# Patient Record
Sex: MALE | Race: ASIAN | NOT HISPANIC OR LATINO | URBAN - METROPOLITAN AREA
[De-identification: names, ages, dates, MRNs, and addresses within clinical notes are randomized per-mention and may not be internally consistent; named-entity substitution may affect disease eponyms.]

---

## 2021-05-22 ENCOUNTER — EMERGENCY (EMERGENCY)
Facility: HOSPITAL | Age: 26
LOS: 0 days | Discharge: HOME | End: 2021-05-22
Attending: EMERGENCY MEDICINE
Payer: COMMERCIAL

## 2021-05-22 VITALS
DIASTOLIC BLOOD PRESSURE: 67 MMHG | HEART RATE: 78 BPM | SYSTOLIC BLOOD PRESSURE: 120 MMHG | TEMPERATURE: 99 F | RESPIRATION RATE: 19 BRPM | OXYGEN SATURATION: 100 %

## 2021-05-22 VITALS
DIASTOLIC BLOOD PRESSURE: 77 MMHG | OXYGEN SATURATION: 99 % | WEIGHT: 149.91 LBS | SYSTOLIC BLOOD PRESSURE: 124 MMHG | TEMPERATURE: 99 F | RESPIRATION RATE: 18 BRPM | HEART RATE: 87 BPM

## 2021-05-22 DIAGNOSIS — E86.0 DEHYDRATION: ICD-10-CM

## 2021-05-22 DIAGNOSIS — A04.72 ENTEROCOLITIS DUE TO CLOSTRIDIUM DIFFICILE, NOT SPECIFIED AS RECURRENT: ICD-10-CM

## 2021-05-22 DIAGNOSIS — R19.7 DIARRHEA, UNSPECIFIED: ICD-10-CM

## 2021-05-22 LAB
ALBUMIN SERPL ELPH-MCNC: 3.8 G/DL — SIGNIFICANT CHANGE UP (ref 3.3–5)
ALP SERPL-CCNC: 65 U/L — SIGNIFICANT CHANGE UP (ref 40–120)
ALT FLD-CCNC: 13 U/L — SIGNIFICANT CHANGE UP (ref 12–78)
ANION GAP SERPL CALC-SCNC: 6 MMOL/L — SIGNIFICANT CHANGE UP (ref 5–17)
AST SERPL-CCNC: 9 U/L — LOW (ref 15–37)
BASOPHILS # BLD AUTO: 0 K/UL — SIGNIFICANT CHANGE UP (ref 0–0.2)
BASOPHILS NFR BLD AUTO: 0 % — SIGNIFICANT CHANGE UP (ref 0–2)
BILIRUB SERPL-MCNC: 0.4 MG/DL — SIGNIFICANT CHANGE UP (ref 0.2–1.2)
BUN SERPL-MCNC: 7 MG/DL — SIGNIFICANT CHANGE UP (ref 7–23)
CALCIUM SERPL-MCNC: 9.7 MG/DL — SIGNIFICANT CHANGE UP (ref 8.5–10.1)
CHLORIDE SERPL-SCNC: 107 MMOL/L — SIGNIFICANT CHANGE UP (ref 96–108)
CO2 SERPL-SCNC: 27 MMOL/L — SIGNIFICANT CHANGE UP (ref 22–31)
CREAT SERPL-MCNC: 1.33 MG/DL — HIGH (ref 0.5–1.3)
EOSINOPHIL # BLD AUTO: 0.19 K/UL — SIGNIFICANT CHANGE UP (ref 0–0.5)
EOSINOPHIL NFR BLD AUTO: 2 % — SIGNIFICANT CHANGE UP (ref 0–6)
GLUCOSE SERPL-MCNC: 88 MG/DL — SIGNIFICANT CHANGE UP (ref 70–99)
HCT VFR BLD CALC: 42.8 % — SIGNIFICANT CHANGE UP (ref 39–50)
HGB BLD-MCNC: 14.5 G/DL — SIGNIFICANT CHANGE UP (ref 13–17)
LIDOCAIN IGE QN: 40 U/L — LOW (ref 73–393)
LYMPHOCYTES # BLD AUTO: 1.68 K/UL — SIGNIFICANT CHANGE UP (ref 1–3.3)
LYMPHOCYTES # BLD AUTO: 18 % — SIGNIFICANT CHANGE UP (ref 13–44)
MANUAL SMEAR VERIFICATION: SIGNIFICANT CHANGE UP
MCHC RBC-ENTMCNC: 29.4 PG — SIGNIFICANT CHANGE UP (ref 27–34)
MCHC RBC-ENTMCNC: 33.9 GM/DL — SIGNIFICANT CHANGE UP (ref 32–36)
MCV RBC AUTO: 86.6 FL — SIGNIFICANT CHANGE UP (ref 80–100)
MONOCYTES # BLD AUTO: 1.3 K/UL — HIGH (ref 0–0.9)
MONOCYTES NFR BLD AUTO: 14 % — SIGNIFICANT CHANGE UP (ref 2–14)
NEUTROPHILS # BLD AUTO: 6.14 K/UL — SIGNIFICANT CHANGE UP (ref 1.8–7.4)
NEUTROPHILS NFR BLD AUTO: 63 % — SIGNIFICANT CHANGE UP (ref 43–77)
NEUTS BAND # BLD: 3 % — SIGNIFICANT CHANGE UP (ref 0–8)
NRBC # BLD: 0 /100 — SIGNIFICANT CHANGE UP (ref 0–0)
NRBC # BLD: SIGNIFICANT CHANGE UP /100 WBCS (ref 0–0)
PLAT MORPH BLD: NORMAL — SIGNIFICANT CHANGE UP
PLATELET # BLD AUTO: 410 K/UL — HIGH (ref 150–400)
POTASSIUM SERPL-MCNC: 3 MMOL/L — LOW (ref 3.5–5.3)
POTASSIUM SERPL-SCNC: 3 MMOL/L — LOW (ref 3.5–5.3)
PROT SERPL-MCNC: 7.8 GM/DL — SIGNIFICANT CHANGE UP (ref 6–8.3)
RBC # BLD: 4.94 M/UL — SIGNIFICANT CHANGE UP (ref 4.2–5.8)
RBC # FLD: 12.4 % — SIGNIFICANT CHANGE UP (ref 10.3–14.5)
RBC BLD AUTO: NORMAL — SIGNIFICANT CHANGE UP
SODIUM SERPL-SCNC: 140 MMOL/L — SIGNIFICANT CHANGE UP (ref 135–145)
WBC # BLD: 9.31 K/UL — SIGNIFICANT CHANGE UP (ref 3.8–10.5)
WBC # FLD AUTO: 9.31 K/UL — SIGNIFICANT CHANGE UP (ref 3.8–10.5)

## 2021-05-22 PROCEDURE — 96360 HYDRATION IV INFUSION INIT: CPT

## 2021-05-22 PROCEDURE — 80053 COMPREHEN METABOLIC PANEL: CPT

## 2021-05-22 PROCEDURE — 99285 EMERGENCY DEPT VISIT HI MDM: CPT

## 2021-05-22 PROCEDURE — 99283 EMERGENCY DEPT VISIT LOW MDM: CPT | Mod: 25

## 2021-05-22 PROCEDURE — 83690 ASSAY OF LIPASE: CPT

## 2021-05-22 PROCEDURE — 36415 COLL VENOUS BLD VENIPUNCTURE: CPT

## 2021-05-22 PROCEDURE — 85025 COMPLETE CBC W/AUTO DIFF WBC: CPT

## 2021-05-22 RX ORDER — POTASSIUM CHLORIDE 20 MEQ
40 PACKET (EA) ORAL ONCE
Refills: 0 | Status: DISCONTINUED | OUTPATIENT
Start: 2021-05-22 | End: 2021-05-22

## 2021-05-22 RX ORDER — SODIUM CHLORIDE 9 MG/ML
2000 INJECTION INTRAMUSCULAR; INTRAVENOUS; SUBCUTANEOUS ONCE
Refills: 0 | Status: COMPLETED | OUTPATIENT
Start: 2021-05-22 | End: 2021-05-22

## 2021-05-22 RX ORDER — POTASSIUM CHLORIDE 20 MEQ
40 PACKET (EA) ORAL ONCE
Refills: 0 | Status: COMPLETED | OUTPATIENT
Start: 2021-05-22 | End: 2021-05-22

## 2021-05-22 RX ORDER — MORPHINE SULFATE 50 MG/1
4 CAPSULE, EXTENDED RELEASE ORAL ONCE
Refills: 0 | Status: DISCONTINUED | OUTPATIENT
Start: 2021-05-22 | End: 2021-05-22

## 2021-05-22 RX ADMIN — SODIUM CHLORIDE 2000 MILLILITER(S): 9 INJECTION INTRAMUSCULAR; INTRAVENOUS; SUBCUTANEOUS at 13:28

## 2021-05-22 RX ADMIN — SODIUM CHLORIDE 2000 MILLILITER(S): 9 INJECTION INTRAMUSCULAR; INTRAVENOUS; SUBCUTANEOUS at 14:28

## 2021-05-22 RX ADMIN — Medication 40 MILLIEQUIVALENT(S): at 14:12

## 2021-05-22 NOTE — ED PROVIDER NOTE - CARE PROVIDER_API CALL
Brendan Doherty)  Gastroenterology; Internal Medicine  [address] [place] [address]  Phone: [phone]  Fax: [phone]  Follow Up Time: 1-3 Days

## 2021-05-22 NOTE — ED ADULT TRIAGE NOTE - CHIEF COMPLAINT QUOTE
Pt presents to the Ed with c/o bloody diarrhea since Sunday.  Sent in by Dr. Gipson for IV hydration.  Hx cdiff. Denies sob or chest pain.

## 2021-05-22 NOTE — ED PROVIDER NOTE - CARE PROVIDERS DIRECT ADDRESSES
[email or web address]

## 2021-05-22 NOTE — ED PROVIDER NOTE - OBJECTIVE STATEMENT
26 y/o male with pmhx of cdiff presents to the ED sent in by Dr. Gipson for IV hydration. Pt has been experiencing bloody stools x6 days. +abd pain. pt treated oral Vancomycin yesterday, takes every 6 hours. Pt states every  time he eats, it goes right through him. Denies SOB, chest pain, vomiting. Denies hx of sx on the abd. No other complaints at this time.

## 2021-05-22 NOTE — ED PROVIDER NOTE - CLINICAL SUMMARY MEDICAL DECISION MAKING FREE TEXT BOX
will discuss with dr ynag regarding care going forward, already had CT which showed pan coleitis, already on Vancomycin, ,really here mostly for IV hydration, will provide iv hydration, check basic labs and reassess. will discuss with dr yang regarding care going forward, already had CT which showed pan colitis, already on oral Vancomycin, ,really here mostly for IV hydration, will provide iv hydration, check basic labs and reassess.

## 2021-05-22 NOTE — ED PROVIDER NOTE - NSFOLLOWUPINSTRUCTIONS_ED_ALL_ED_FT
Acute Diarrhea    WHAT YOU NEED TO KNOW:    Acute diarrhea starts quickly and lasts a short time, usually 1 to 3 days. It can last up to 2 weeks. You may not be able to control your diarrhea. Acute diarrhea usually stops on its own.     DISCHARGE INSTRUCTIONS:    Return to the emergency department if:     You feel confused.       Your heartbeat is faster than usual.       Your eyes look deeply sunken, or you have no tears when you cry.       You urinate less than usual, or your urine is dark yellow.       You have blood or mucus in your bowel movements.      You have severe abdominal pain.       You are unable to drink any liquids.     Contact your healthcare provider if:     Your symptoms do not get better with treatment.       You have a fever higher than 101.3°F (38.5°C).       You have trouble eating and drinking because you are vomiting.       Your diarrhea does not get better in 7 days.       You have questions or concerns about your condition or care.     Follow up with your healthcare provider as directed: Write down your questions so you remember to ask them during your visits.     Medicines:    Diarrhea medicine is an over-the-counter medicine that helps slow or stop your diarrhea. Do not take this medicine unless your healthcare provider says it is okay.       Antibiotics may be given to help treat an infection caused by bacteria.       Antiparasitics may be given to treat an infection caused by parasites.       Take your medicine as directed. Contact your healthcare provider if you think your medicine is not helping or if you have side effects. Tell him of her if you are allergic to any medicine. Keep a list of the medicines, vitamins, and herbs you take. Include the amounts, and when and why you take them. Bring the list or the pill bottles to follow-up visits. Carry your medicine list with you in case of an emergency.    Self-care:     Drink liquids as directed. Liquids will help prevent dehydration caused by diarrhea. Ask your healthcare provider how much liquid to drink each day and which liquids are best for you. You may need to drink an oral rehydration solution (ORS). An ORS has the right amounts of water, salts, and sugar you need to replace body fluids. You can buy an ORS at most grocery stores and pharmacies.       Eat foods that are easy to digest. Examples include rice, lentils, cereal, bananas, potatoes, and bread. It also includes some fruits (bananas, melon), well-cooked vegetables, and lean meats. Do not eat foods high in fiber, fat, and sugar. Do not drink alcohol until your diarrhea is gone.     Prevent acute diarrhea:     Wash your hands often. Use soap and water. Wash your hands before you eat or prepare food. Also wash your hands after you use the bathroom. Use an alcohol-based hand gel when soap and water are not available. Handwashing           Keep bathroom surfaces clean. This helps prevent the spread of germs that cause acute diarrhea.       Wash fruits and vegetables well before you eat them. This can help remove germs that cause diarrhea. If possible, remove the skin from fruits and vegetables, or cook them well before you eat them.       Cook meat and poultry as directed. Meat includes beef and pork. Poultry includes chicken, turkey, and duck.  Cook ground meat to 160°F.       Cook ground poultry, whole poultry, or cuts of poultry to at least 165°F. Remove the poultry from heat. Let it stand for 3 minutes before you eat it.       Cook whole cuts of meat other than poultry to at least 145°F. Remove the meat from heat. Let it stand for 3 minutes before you eat it.       Wash dishes that have touched raw meat or poultry with hot water and soap. This includes cutting boards, utensils, dishes, and serving containers.       Place raw or cooked meat or poultry in the refrigerator as soon as possible. Bacteria can grow in meat or poultry that is left at room temperature too long.       Do not eat raw or undercooked oysters, clams, or mussels. These foods may be contaminated and cause infection.       Drink only filtered or treated water when you travel. Do not put ice in your drinks. Drink bottled water whenever possible.      Dehydration    WHAT YOU NEED TO KNOW:    Dehydration is a condition that develops when your body does not have enough fluid. You may become dehydrated if you do not drink enough water or lose too much fluid. Fluid loss may also cause loss of electrolytes (minerals), such as sodium.    DISCHARGE INSTRUCTIONS:    Seek care immediately if:   •You have a seizure.      •You are confused or cannot think clearly.      •You are extremely sleepy, or another person cannot wake you.       •You become dizzy or faint when you stand.      •You are not able to urinate.      •You have trouble breathing.      •You have a fast or irregular heartbeat.      •Your hands or feet are cold, or your face is pale.       Contact your healthcare provider if:   •You have trouble drinking liquids because you are vomiting.      •Your symptoms get worse.      •You have a fever.       •You feel very weak or tired.      •You have questions or concerns about your condition or care.      Follow up with your healthcare provider as directed: Write down your questions so you remember to ask them during your visits.     Prevent or manage dehydration:   •Drink liquids as directed. Liquids that contain water, sugar, and minerals can help your body hold in fluid and help prevent dehydration. Drink liquids throughout the day, not just when you feel thirsty. Men should drink about 3 liters (13 eight-ounce cups) of liquid each day. Women should drink about 2 liters (9 eight-ounce cups) of liquid each day. Drink even more liquid if you will be outdoors, in the sun for a long time, or exercising.       •Stay cool. Limit the time you spend outdoors during the hottest part of the day. Dress in lightweight clothes.       •Keep track of how often you urinate. If you urinate less than usual or your urine is darker, drink more liquids.

## 2021-05-22 NOTE — ED PROVIDER NOTE - CARE PLAN
Principal Discharge DX:	Dehydration  Secondary Diagnosis:	Bloody diarrhea  Secondary Diagnosis:	Clostridium difficile diarrhea

## 2021-05-22 NOTE — ED PROVIDER NOTE - PATIENT PORTAL LINK FT
You can access the FollowMyHealth Patient Portal offered by [facility] by registering at the following website: [email or web address]. By joining [employer]’s FollowMyHealth portal, you will also be able to view your health information using other applications (apps) compatible with our system.

## 2021-05-22 NOTE — ED PROVIDER NOTE - NO SIGNIFICANT PAST SURGICAL HISTORY
<<----- Click to add NO significant Past Surgical History PLT count 77k this admission, compared to 75k on previous admission (comparable).  This, in addition to elevated INR and hyponatremia, are likely in setting of end stage liver disease.   No active bleeding.

## 2021-05-22 NOTE — ED PROVIDER NOTE - PROGRESS NOTE DETAILS
Discussed with Dr. Doherty.  Pt to have h/h checked.  No symptoms of anemia.  h/h unremarkable.  AVSS.  Minimal ttp to abdomen.  Tolerating PO.  On appropriate meds.  Likely some combo of uc flare and C. diff.  Otherwise well appearing.  D/c home with strict return precautions and prompt outpatient f/u.

## 2021-05-22 NOTE — ED ADULT NURSE NOTE - CHPI ED NUR SYMPTOMS NEG
no abdominal distension/no burning urination/no chills/no dysuria/no fever/no hematuria/no nausea/no vomiting

## 2021-05-22 NOTE — ED ADULT NURSE NOTE - OBJECTIVE STATEMENT
Pt alert and oriented x4, able to ambulate without assistance. Pt history colitis and cdiff on oral vancomycin. Pt denies vomiting, chest pain, shortness of breath. Pt also states he has blood in stool. Pt denies fever/chills

## 2021-05-31 ENCOUNTER — INPATIENT (INPATIENT)
Facility: HOSPITAL | Age: 26
LOS: 2 days | Discharge: HOME | DRG: 372 | End: 2021-06-03
Attending: INTERNAL MEDICINE | Admitting: STUDENT IN AN ORGANIZED HEALTH CARE EDUCATION/TRAINING PROGRAM
Payer: COMMERCIAL

## 2021-05-31 VITALS — HEIGHT: 72 IN | WEIGHT: 156.97 LBS

## 2021-05-31 LAB
ALBUMIN SERPL ELPH-MCNC: 3 G/DL — LOW (ref 3.3–5)
ALP SERPL-CCNC: 61 U/L — SIGNIFICANT CHANGE UP (ref 40–120)
ALT FLD-CCNC: 23 U/L — SIGNIFICANT CHANGE UP (ref 12–78)
ANION GAP SERPL CALC-SCNC: 4 MMOL/L — LOW (ref 5–17)
AST SERPL-CCNC: 15 U/L — SIGNIFICANT CHANGE UP (ref 15–37)
BASOPHILS # BLD AUTO: 0.09 K/UL — SIGNIFICANT CHANGE UP (ref 0–0.2)
BASOPHILS NFR BLD AUTO: 0.8 % — SIGNIFICANT CHANGE UP (ref 0–2)
BILIRUB SERPL-MCNC: 0.2 MG/DL — SIGNIFICANT CHANGE UP (ref 0.2–1.2)
BUN SERPL-MCNC: 9 MG/DL — SIGNIFICANT CHANGE UP (ref 7–23)
CALCIUM SERPL-MCNC: 8.9 MG/DL — SIGNIFICANT CHANGE UP (ref 8.5–10.1)
CHLORIDE SERPL-SCNC: 106 MMOL/L — SIGNIFICANT CHANGE UP (ref 96–108)
CO2 SERPL-SCNC: 29 MMOL/L — SIGNIFICANT CHANGE UP (ref 22–31)
CREAT SERPL-MCNC: 1.14 MG/DL — SIGNIFICANT CHANGE UP (ref 0.5–1.3)
EOSINOPHIL # BLD AUTO: 0.67 K/UL — HIGH (ref 0–0.5)
EOSINOPHIL NFR BLD AUTO: 5.8 % — SIGNIFICANT CHANGE UP (ref 0–6)
GLUCOSE SERPL-MCNC: 87 MG/DL — SIGNIFICANT CHANGE UP (ref 70–99)
HCT VFR BLD CALC: 37.1 % — LOW (ref 39–50)
HGB BLD-MCNC: 12.6 G/DL — LOW (ref 13–17)
IMM GRANULOCYTES NFR BLD AUTO: 0.4 % — SIGNIFICANT CHANGE UP (ref 0–1.5)
LACTATE SERPL-SCNC: 1 MMOL/L — SIGNIFICANT CHANGE UP (ref 0.7–2)
LIDOCAIN IGE QN: 93 U/L — SIGNIFICANT CHANGE UP (ref 73–393)
LYMPHOCYTES # BLD AUTO: 18 % — SIGNIFICANT CHANGE UP (ref 13–44)
LYMPHOCYTES # BLD AUTO: 2.07 K/UL — SIGNIFICANT CHANGE UP (ref 1–3.3)
MAGNESIUM SERPL-MCNC: 2.3 MG/DL — SIGNIFICANT CHANGE UP (ref 1.6–2.6)
MCHC RBC-ENTMCNC: 30 PG — SIGNIFICANT CHANGE UP (ref 27–34)
MCHC RBC-ENTMCNC: 34 GM/DL — SIGNIFICANT CHANGE UP (ref 32–36)
MCV RBC AUTO: 88.3 FL — SIGNIFICANT CHANGE UP (ref 80–100)
MONOCYTES # BLD AUTO: 1.78 K/UL — HIGH (ref 0–0.9)
MONOCYTES NFR BLD AUTO: 15.5 % — HIGH (ref 2–14)
NEUTROPHILS # BLD AUTO: 6.85 K/UL — SIGNIFICANT CHANGE UP (ref 1.8–7.4)
NEUTROPHILS NFR BLD AUTO: 59.5 % — SIGNIFICANT CHANGE UP (ref 43–77)
PLATELET # BLD AUTO: 549 K/UL — HIGH (ref 150–400)
POTASSIUM SERPL-MCNC: 3.2 MMOL/L — LOW (ref 3.5–5.3)
POTASSIUM SERPL-SCNC: 3.2 MMOL/L — LOW (ref 3.5–5.3)
PROT SERPL-MCNC: 7.2 GM/DL — SIGNIFICANT CHANGE UP (ref 6–8.3)
RBC # BLD: 4.2 M/UL — SIGNIFICANT CHANGE UP (ref 4.2–5.8)
RBC # FLD: 12.8 % — SIGNIFICANT CHANGE UP (ref 10.3–14.5)
SODIUM SERPL-SCNC: 139 MMOL/L — SIGNIFICANT CHANGE UP (ref 135–145)
WBC # BLD: 11.51 K/UL — HIGH (ref 3.8–10.5)
WBC # FLD AUTO: 11.51 K/UL — HIGH (ref 3.8–10.5)

## 2021-05-31 PROCEDURE — 99285 EMERGENCY DEPT VISIT HI MDM: CPT

## 2021-05-31 RX ORDER — SODIUM CHLORIDE 9 MG/ML
1000 INJECTION INTRAMUSCULAR; INTRAVENOUS; SUBCUTANEOUS ONCE
Refills: 0 | Status: COMPLETED | OUTPATIENT
Start: 2021-05-31 | End: 2021-05-31

## 2021-05-31 RX ORDER — SODIUM CHLORIDE 9 MG/ML
2000 INJECTION INTRAMUSCULAR; INTRAVENOUS; SUBCUTANEOUS ONCE
Refills: 0 | Status: COMPLETED | OUTPATIENT
Start: 2021-05-31 | End: 2021-05-31

## 2021-05-31 RX ADMIN — SODIUM CHLORIDE 2000 MILLILITER(S): 9 INJECTION INTRAMUSCULAR; INTRAVENOUS; SUBCUTANEOUS at 22:13

## 2021-05-31 RX ADMIN — SODIUM CHLORIDE 2000 MILLILITER(S): 9 INJECTION INTRAMUSCULAR; INTRAVENOUS; SUBCUTANEOUS at 23:23

## 2021-05-31 NOTE — ED ADULT TRIAGE NOTE - CHIEF COMPLAINT QUOTE
diffuse abdominal pain, nausea, multiple episodes of diarrhea worsening over past two weeks.  diagnosed with c-diff colitis. completed full course vancomycin. states he feels dehydrated due to diarrhea.

## 2021-05-31 NOTE — ED ADULT NURSE NOTE - OBJECTIVE STATEMENT
pt presents to the ED c/o diffuse abdominal pain with diarrhea. Pt states he has a hx of UC and was recently diagnosed with c.diff. Pt states he completed a 10 day course of vancomycin. States he has about 10 episodes of diarrhea a day and "feels dehydrated." Pt denies chest pain, SOB, dizziness, nausea at this time. All safety measures in place.

## 2021-05-31 NOTE — ED PROVIDER NOTE - OBJECTIVE STATEMENT
26 y/o M with PMHx of UC and C. diff colitis presents ambulatory to the ED c/o diffuse +abd pain with associated +nausea and frequent episodes of +diarrhea. Notes +decreased PO intake, +15 lb weight loss, and +blood in stool as well. Recently dx with C. diff and already completed full 10 day course of vancomycin. Started on Dificid, has taken 2 doses so far without relief. No fever. NKDA. GI: Dr. Brendan Doherty.

## 2021-05-31 NOTE — ED PROVIDER NOTE - CLINICAL SUMMARY MEDICAL DECISION MAKING FREE TEXT BOX
CT oral contrast, rehydration. Pt likely with C. diff refractory to abx at this point. Also appears to have exacerbation of underlying UC with rectal bleeding, for which he's followed closely with Dr. Doherty. Dispo pending labs, imaging, reassess.

## 2021-06-01 DIAGNOSIS — A04.72 ENTEROCOLITIS DUE TO CLOSTRIDIUM DIFFICILE, NOT SPECIFIED AS RECURRENT: ICD-10-CM

## 2021-06-01 LAB
ALBUMIN SERPL ELPH-MCNC: 2.8 G/DL — LOW (ref 3.3–5)
ALP SERPL-CCNC: 50 U/L — SIGNIFICANT CHANGE UP (ref 40–120)
ALT FLD-CCNC: 14 U/L — SIGNIFICANT CHANGE UP (ref 12–78)
ANION GAP SERPL CALC-SCNC: 6 MMOL/L — SIGNIFICANT CHANGE UP (ref 5–17)
APPEARANCE UR: CLEAR — SIGNIFICANT CHANGE UP
APTT BLD: 27 SEC — LOW (ref 27.5–35.5)
AST SERPL-CCNC: 10 U/L — LOW (ref 15–37)
BASOPHILS # BLD AUTO: 0.07 K/UL — SIGNIFICANT CHANGE UP (ref 0–0.2)
BASOPHILS NFR BLD AUTO: 0.6 % — SIGNIFICANT CHANGE UP (ref 0–2)
BILIRUB SERPL-MCNC: 0.2 MG/DL — SIGNIFICANT CHANGE UP (ref 0.2–1.2)
BILIRUB UR-MCNC: NEGATIVE — SIGNIFICANT CHANGE UP
BUN SERPL-MCNC: 5 MG/DL — LOW (ref 7–23)
C DIFF BY PCR RESULT: SIGNIFICANT CHANGE UP
C DIFF TOX GENS STL QL NAA+PROBE: SIGNIFICANT CHANGE UP
CALCIUM SERPL-MCNC: 8.9 MG/DL — SIGNIFICANT CHANGE UP (ref 8.5–10.1)
CHLORIDE SERPL-SCNC: 111 MMOL/L — HIGH (ref 96–108)
CO2 SERPL-SCNC: 26 MMOL/L — SIGNIFICANT CHANGE UP (ref 22–31)
COLOR SPEC: YELLOW — SIGNIFICANT CHANGE UP
CREAT SERPL-MCNC: 0.91 MG/DL — SIGNIFICANT CHANGE UP (ref 0.5–1.3)
CULTURE RESULTS: SIGNIFICANT CHANGE UP
DIFF PNL FLD: NEGATIVE — SIGNIFICANT CHANGE UP
EOSINOPHIL # BLD AUTO: 0.63 K/UL — HIGH (ref 0–0.5)
EOSINOPHIL NFR BLD AUTO: 5.1 % — SIGNIFICANT CHANGE UP (ref 0–6)
GLUCOSE SERPL-MCNC: 87 MG/DL — SIGNIFICANT CHANGE UP (ref 70–99)
GLUCOSE UR QL: NEGATIVE MG/DL — SIGNIFICANT CHANGE UP
HCT VFR BLD CALC: 32.9 % — LOW (ref 39–50)
HGB BLD-MCNC: 11.1 G/DL — LOW (ref 13–17)
IMM GRANULOCYTES NFR BLD AUTO: 0.3 % — SIGNIFICANT CHANGE UP (ref 0–1.5)
INR BLD: 1.25 RATIO — HIGH (ref 0.88–1.16)
KETONES UR-MCNC: NEGATIVE — SIGNIFICANT CHANGE UP
LEUKOCYTE ESTERASE UR-ACNC: NEGATIVE — SIGNIFICANT CHANGE UP
LYMPHOCYTES # BLD AUTO: 19.1 % — SIGNIFICANT CHANGE UP (ref 13–44)
LYMPHOCYTES # BLD AUTO: 2.37 K/UL — SIGNIFICANT CHANGE UP (ref 1–3.3)
MAGNESIUM SERPL-MCNC: 2.2 MG/DL — SIGNIFICANT CHANGE UP (ref 1.6–2.6)
MCHC RBC-ENTMCNC: 29.9 PG — SIGNIFICANT CHANGE UP (ref 27–34)
MCHC RBC-ENTMCNC: 33.7 GM/DL — SIGNIFICANT CHANGE UP (ref 32–36)
MCV RBC AUTO: 88.7 FL — SIGNIFICANT CHANGE UP (ref 80–100)
MONOCYTES # BLD AUTO: 1.85 K/UL — HIGH (ref 0–0.9)
MONOCYTES NFR BLD AUTO: 14.9 % — HIGH (ref 2–14)
NEUTROPHILS # BLD AUTO: 7.46 K/UL — HIGH (ref 1.8–7.4)
NEUTROPHILS NFR BLD AUTO: 60 % — SIGNIFICANT CHANGE UP (ref 43–77)
NITRITE UR-MCNC: NEGATIVE — SIGNIFICANT CHANGE UP
PH UR: 7 — SIGNIFICANT CHANGE UP (ref 5–8)
PHOSPHATE SERPL-MCNC: 3 MG/DL — SIGNIFICANT CHANGE UP (ref 2.5–4.5)
PLATELET # BLD AUTO: 485 K/UL — HIGH (ref 150–400)
POTASSIUM SERPL-MCNC: 3.1 MMOL/L — LOW (ref 3.5–5.3)
POTASSIUM SERPL-SCNC: 3.1 MMOL/L — LOW (ref 3.5–5.3)
PROT SERPL-MCNC: 6.2 GM/DL — SIGNIFICANT CHANGE UP (ref 6–8.3)
PROT UR-MCNC: NEGATIVE MG/DL — SIGNIFICANT CHANGE UP
PROTHROM AB SERPL-ACNC: 14.3 SEC — HIGH (ref 10.6–13.6)
RAPID RVP RESULT: SIGNIFICANT CHANGE UP
RBC # BLD: 3.71 M/UL — LOW (ref 4.2–5.8)
RBC # FLD: 12.8 % — SIGNIFICANT CHANGE UP (ref 10.3–14.5)
SARS-COV-2 RNA SPEC QL NAA+PROBE: SIGNIFICANT CHANGE UP
SODIUM SERPL-SCNC: 143 MMOL/L — SIGNIFICANT CHANGE UP (ref 135–145)
SP GR SPEC: 1 — LOW (ref 1.01–1.02)
SPECIMEN SOURCE: SIGNIFICANT CHANGE UP
UROBILINOGEN FLD QL: NEGATIVE MG/DL — SIGNIFICANT CHANGE UP
WBC # BLD: 12.42 K/UL — HIGH (ref 3.8–10.5)
WBC # FLD AUTO: 12.42 K/UL — HIGH (ref 3.8–10.5)

## 2021-06-01 PROCEDURE — 85025 COMPLETE CBC W/AUTO DIFF WBC: CPT

## 2021-06-01 PROCEDURE — 74177 CT ABD & PELVIS W/CONTRAST: CPT | Mod: 26,MA

## 2021-06-01 PROCEDURE — 36415 COLL VENOUS BLD VENIPUNCTURE: CPT

## 2021-06-01 PROCEDURE — 99223 1ST HOSP IP/OBS HIGH 75: CPT

## 2021-06-01 PROCEDURE — 87252 VIRUS INOCULATION TISSUE: CPT

## 2021-06-01 PROCEDURE — 84100 ASSAY OF PHOSPHORUS: CPT

## 2021-06-01 PROCEDURE — 80048 BASIC METABOLIC PNL TOTAL CA: CPT

## 2021-06-01 PROCEDURE — 83735 ASSAY OF MAGNESIUM: CPT

## 2021-06-01 PROCEDURE — 88305 TISSUE EXAM BY PATHOLOGIST: CPT

## 2021-06-01 PROCEDURE — 85610 PROTHROMBIN TIME: CPT

## 2021-06-01 PROCEDURE — 85027 COMPLETE CBC AUTOMATED: CPT

## 2021-06-01 PROCEDURE — 80053 COMPREHEN METABOLIC PANEL: CPT

## 2021-06-01 PROCEDURE — 85730 THROMBOPLASTIN TIME PARTIAL: CPT

## 2021-06-01 PROCEDURE — 86769 SARS-COV-2 COVID-19 ANTIBODY: CPT

## 2021-06-01 RX ORDER — POTASSIUM CHLORIDE 20 MEQ
10 PACKET (EA) ORAL ONCE
Refills: 0 | Status: COMPLETED | OUTPATIENT
Start: 2021-06-01 | End: 2021-06-01

## 2021-06-01 RX ORDER — SODIUM CHLORIDE 9 MG/ML
1000 INJECTION INTRAMUSCULAR; INTRAVENOUS; SUBCUTANEOUS
Refills: 0 | Status: DISCONTINUED | OUTPATIENT
Start: 2021-06-01 | End: 2021-06-01

## 2021-06-01 RX ORDER — SOD SULF/SODIUM/NAHCO3/KCL/PEG
2000 SOLUTION, RECONSTITUTED, ORAL ORAL ONCE
Refills: 0 | Status: COMPLETED | OUTPATIENT
Start: 2021-06-01 | End: 2021-06-01

## 2021-06-01 RX ORDER — POTASSIUM CHLORIDE 20 MEQ
10 PACKET (EA) ORAL ONCE
Refills: 0 | Status: DISCONTINUED | OUTPATIENT
Start: 2021-06-01 | End: 2021-06-01

## 2021-06-01 RX ORDER — CIPROFLOXACIN LACTATE 400MG/40ML
500 VIAL (ML) INTRAVENOUS DAILY
Refills: 0 | Status: DISCONTINUED | OUTPATIENT
Start: 2021-06-01 | End: 2021-06-03

## 2021-06-01 RX ORDER — SODIUM CHLORIDE 9 MG/ML
1000 INJECTION INTRAMUSCULAR; INTRAVENOUS; SUBCUTANEOUS
Refills: 0 | Status: DISCONTINUED | OUTPATIENT
Start: 2021-06-01 | End: 2021-06-03

## 2021-06-01 RX ORDER — HEPARIN SODIUM 5000 [USP'U]/ML
5000 INJECTION INTRAVENOUS; SUBCUTANEOUS EVERY 12 HOURS
Refills: 0 | Status: DISCONTINUED | OUTPATIENT
Start: 2021-06-01 | End: 2021-06-03

## 2021-06-01 RX ORDER — MESALAMINE 400 MG
800 TABLET, DELAYED RELEASE (ENTERIC COATED) ORAL THREE TIMES A DAY
Refills: 0 | Status: DISCONTINUED | OUTPATIENT
Start: 2021-06-01 | End: 2021-06-03

## 2021-06-01 RX ORDER — FIDAXOMICIN 200 MG/5ML
1 GRANULE, FOR SUSPENSION ORAL
Qty: 0 | Refills: 0 | DISCHARGE

## 2021-06-01 RX ORDER — FIDAXOMICIN 200 MG/5ML
200 GRANULE, FOR SUSPENSION ORAL
Refills: 0 | Status: DISCONTINUED | OUTPATIENT
Start: 2021-06-01 | End: 2021-06-03

## 2021-06-01 RX ORDER — POTASSIUM CHLORIDE 20 MEQ
20 PACKET (EA) ORAL ONCE
Refills: 0 | Status: COMPLETED | OUTPATIENT
Start: 2021-06-01 | End: 2021-06-01

## 2021-06-01 RX ADMIN — SODIUM CHLORIDE 150 MILLILITER(S): 9 INJECTION INTRAMUSCULAR; INTRAVENOUS; SUBCUTANEOUS at 06:22

## 2021-06-01 RX ADMIN — Medication 2000 MILLILITER(S): at 17:29

## 2021-06-01 RX ADMIN — FIDAXOMICIN 200 MILLIGRAM(S): 200 GRANULE, FOR SUSPENSION ORAL at 22:08

## 2021-06-01 RX ADMIN — Medication 20 MILLIEQUIVALENT(S): at 06:22

## 2021-06-01 RX ADMIN — Medication 100 MILLIEQUIVALENT(S): at 11:22

## 2021-06-01 RX ADMIN — Medication 10 MILLIGRAM(S): at 18:20

## 2021-06-01 RX ADMIN — Medication 800 MILLIGRAM(S): at 22:08

## 2021-06-01 RX ADMIN — Medication 800 MILLIGRAM(S): at 15:58

## 2021-06-01 RX ADMIN — Medication 500 MILLIGRAM(S): at 15:58

## 2021-06-01 RX ADMIN — SODIUM CHLORIDE 150 MILLILITER(S): 9 INJECTION INTRAMUSCULAR; INTRAVENOUS; SUBCUTANEOUS at 17:29

## 2021-06-01 NOTE — ED ADULT NURSE REASSESSMENT NOTE - NS ED NURSE REASSESS COMMENT FT1
report given to Caroline SULLIVAN. Pt is stable at this time and awaiting med surg bed upstairs. Aware of plan at this time.

## 2021-06-01 NOTE — ED ADULT NURSE REASSESSMENT NOTE - NS ED NURSE REASSESS COMMENT FT1
pt resting comfortably at this time waiting for cat scan results. No episodes of diarrhea so far while in ED.

## 2021-06-01 NOTE — PROGRESS NOTE ADULT - SUBJECTIVE AND OBJECTIVE BOX
Reason for Admission: diarrhea  History of Present Illness:   26 y/o M w/ PMH of ulcerative colitis, p/w diarrhea. States he has had diarrhea for 2 weeks, and completed PO vanco for c.diff and was recently started on Dificid by his brother, who is a gastroenterologist (not affiliated [facility]). Patient also noticed seeing blood in stool, and has some abdominal pain. Presently patient is comfortable. Denies nausea, vomiting, fever, chills, cough, runny nose, sore throat, CP, SOB.    Medical progress: Patient very irritated -  and wants to leave. I provided to the patient with all the information in regards of the CT scan findings -  need for antibiotics.   Complaints: wants to leave as he feels well  Consult: Call made out to Dr. DURÁN -  awaiting callback.     REVIEW OF SYSTEMS:  General: NAD, hemodynamically stable, (-)  fever, (-) chills, (-) weakness  HEENT:  Eyes:  No visual loss, blurred vision, double vision or yellow sclerae. Ears, Nose, Throat:  No hearing loss, sneezing, congestion, runny nose or sore throat.  SKIN:  No rash or itching.  CARDIOVASCULAR:  No chest pain, chest pressure or chest discomfort. No palpitations or edema.  RESPIRATORY:  No shortness of breath, cough or sputum.  GASTROINTESTINAL:  No anorexia, nausea, vomiting or diarrhea. No abdominal pain or blood.  NEUROLOGICAL:  No headache, dizziness, syncope, paralysis, ataxia, numbness or tingling in the extremities. No change in bowel or bladder control.  MUSCULOSKELETAL:  No muscle, back pain, joint pain or stiffness.  HEMATOLOGIC:  No anemia, bleeding or bruising.  LYMPHATICS:  No enlarged nodes. No history of splenectomy.  ENDOCRINOLOGIC:  No reports of sweating, cold or heat intolerance. No polyuria or polydipsia.  ALLERGIES:  No history of asthma, hives, eczema or rhinitis.    Physical Exam:   GENERAL APPEARANCE:  NAD, hemodynamically stable  T(C): 37.1 (21 @ 07:10), Max: 37.1 (21 @ 07:10)  HR: 65 (21 @ 07:10) (65 - 89)  BP: 100/50 (21 @ 07:10) (100/50 - 118/75)  RR: 14 (21 @ 07:10) (14 - 18)  SpO2: 100% (21 @ 07:10) (97% - 100%)  Wt(kg): --  HEENT:  Head is normocephalic    Skin:  Warm and dry without any rash   NECK:  Supple without lymphadenopathy.   HEART:  Regular rate and rhythm. normal S1 and S2, No M/R/G  LUNGS:  Good ins/exp effort, no W/R/R/C  ABDOMEN:  Soft, nontender, nondistended with good bowel sounds heard  EXTREMITIES:  Without cyanosis, clubbing or edema.   NEUROLOGICAL:  Gross nonfocal       CBC Full  -  ( 2021 07:46 )  WBC Count : 12.42 K/uL  RBC Count : 3.71 M/uL  Hemoglobin : 11.1 g/dL  Hematocrit : 32.9 %  Platelet Count - Automated : 485 K/uL  Mean Cell Volume : 88.7 fl  Mean Cell Hemoglobin : 29.9 pg  Mean Cell Hemoglobin Concentration : 33.7 gm/dL  Auto Neutrophil # : 7.46 K/uL  Auto Lymphocyte # : 2.37 K/uL  Auto Monocyte # : 1.85 K/uL  Auto Eosinophil # : 0.63 K/uL  Auto Basophil # : 0.07 K/uL  Auto Neutrophil % : 60.0 %  Auto Lymphocyte % : 19.1 %  Auto Monocyte % : 14.9 %  Auto Eosinophil % : 5.1 %  Auto Basophil % : 0.6 %    PT/INR - ( 2021 07:46 )   PT: 14.3 sec;   INR: 1.25 ratio         PTT - ( 2021 07:46 )  PTT:27.0 sec  Urinalysis Basic - ( 2021 00:05 )    Color: Yellow / Appearance: Clear / S.005 / pH: x  Gluc: x / Ketone: Negative  / Bili: Negative / Urobili: Negative mg/dL   Blood: x / Protein: Negative mg/dL / Nitrite: Negative   Leuk Esterase: Negative / RBC: x / WBC x   Sq Epi: x / Non Sq Epi: x / Bacteria: x          143  |  111<H>  |  5<L>  ----------------------------<  87  3.1<L>   |  26  |  0.91    Ca    8.9      2021 07:46  Phos  3.0     06-  Mg     2.2     06-    TPro  6.2  /  Alb  2.8<L>  /  TBili  0.2  /  DBili  x   /  AST  10<L>  /  ALT  14  /  AlkPhos  50  -      26 y/o M w/ PMH of ulcerative colitis, p/w diarrhea.    * Colitis differential includes and is not limited to infectious /  informatory    - CT w/ recent C.diff s/p PO vanco and h/o ulcerative colitis  - F/u C.diff/ GI PCR  - Given h/o c.diff, will defer to GI and ID regarding starting Cipro / Flagyl at this time for other possible causes of colitis.  - Will c/w Dificid for now (non-formulary as per pharmacy, patient will have to take own med)  - IVF  - NPO for now  - Trend H/H   - Isolation precautions   - awaiting callback from GI    * Liver hypodense focus noted on CT  -F/u GI recommendations for further work up     * Hypokalemia  -Replete and recheck           Reason for Admission: diarrhea  History of Present Illness:   26 y/o M w/ PMH of ulcerative colitis, p/w diarrhea. States he has had diarrhea for 2 weeks, and completed PO vanco for c.diff and was recently started on Dificid by his brother, who is a gastroenterologist (not affiliated [facility]). Patient also noticed seeing blood in stool, and has some abdominal pain. Presently patient is comfortable. Denies nausea, vomiting, fever, chills, cough, runny nose, sore throat, CP, SOB.    Medical progress:  I provided to the patient with all the information in regards of the CT scan findings -  need for antibiotics. Colonoscopy in the am. NPO after midnight  Complaints: wants to leave as he feels well  Consult: Call made out to Dr. DURÁN -  awaiting callback.     REVIEW OF SYSTEMS:  General: NAD, hemodynamically stable, (-)  fever, (-) chills, (-) weakness  HEENT:  Eyes:  No visual loss, blurred vision, double vision or yellow sclerae. Ears, Nose, Throat:  No hearing loss, sneezing, congestion, runny nose or sore throat.  SKIN:  No rash or itching.  CARDIOVASCULAR:  No chest pain, chest pressure or chest discomfort. No palpitations or edema.  RESPIRATORY:  No shortness of breath, cough or sputum.  GASTROINTESTINAL:  No anorexia, nausea, vomiting or diarrhea. No abdominal pain or blood.  NEUROLOGICAL:  No headache, dizziness, syncope, paralysis, ataxia, numbness or tingling in the extremities. No change in bowel or bladder control.  MUSCULOSKELETAL:  No muscle, back pain, joint pain or stiffness.  HEMATOLOGIC:  No anemia, bleeding or bruising.  LYMPHATICS:  No enlarged nodes. No history of splenectomy.  ENDOCRINOLOGIC:  No reports of sweating, cold or heat intolerance. No polyuria or polydipsia.  ALLERGIES:  No history of asthma, hives, eczema or rhinitis.    Physical Exam:   GENERAL APPEARANCE:  NAD, hemodynamically stable  T(C): 37.1 (21 @ 07:10), Max: 37.1 (21 @ 07:10)  HR: 65 (21 @ 07:10) (65 - 89)  BP: 100/50 (21 @ 07:10) (100/50 - 118/75)  RR: 14 (06-01-21 @ 07:10) (14 - 18)  SpO2: 100% (21 @ 07:10) (97% - 100%)  Wt(kg): --  HEENT:  Head is normocephalic    Skin:  Warm and dry without any rash   NECK:  Supple without lymphadenopathy.   HEART:  Regular rate and rhythm. normal S1 and S2, No M/R/G  LUNGS:  Good ins/exp effort, no W/R/R/C  ABDOMEN:  Soft, nontender, nondistended with good bowel sounds heard  EXTREMITIES:  Without cyanosis, clubbing or edema.   NEUROLOGICAL:  Gross nonfocal       CBC Full  -  ( 2021 07:46 )  WBC Count : 12.42 K/uL  RBC Count : 3.71 M/uL  Hemoglobin : 11.1 g/dL  Hematocrit : 32.9 %  Platelet Count - Automated : 485 K/uL  Mean Cell Volume : 88.7 fl  Mean Cell Hemoglobin : 29.9 pg  Mean Cell Hemoglobin Concentration : 33.7 gm/dL  Auto Neutrophil # : 7.46 K/uL  Auto Lymphocyte # : 2.37 K/uL  Auto Monocyte # : 1.85 K/uL  Auto Eosinophil # : 0.63 K/uL  Auto Basophil # : 0.07 K/uL  Auto Neutrophil % : 60.0 %  Auto Lymphocyte % : 19.1 %  Auto Monocyte % : 14.9 %  Auto Eosinophil % : 5.1 %  Auto Basophil % : 0.6 %    PT/INR - ( 2021 07:46 )   PT: 14.3 sec;   INR: 1.25 ratio         PTT - ( 2021 07:46 )  PTT:27.0 sec  Urinalysis Basic - ( 2021 00:05 )    Color: Yellow / Appearance: Clear / S.005 / pH: x  Gluc: x / Ketone: Negative  / Bili: Negative / Urobili: Negative mg/dL   Blood: x / Protein: Negative mg/dL / Nitrite: Negative   Leuk Esterase: Negative / RBC: x / WBC x   Sq Epi: x / Non Sq Epi: x / Bacteria: x          143  |  111<H>  |  5<L>  ----------------------------<  87  3.1<L>   |  26  |  0.91    Ca    8.9      2021 07:46  Phos  3.0     06-  Mg     2.2     -    TPro  6.2  /  Alb  2.8<L>  /  TBili  0.2  /  DBili  x   /  AST  10<L>  /  ALT  14  /  AlkPhos  50  -      26 y/o M w/ PMH of ulcerative colitis, p/w diarrhea.    * Colitis differential includes and is not limited to infectious /  informatory    - CT w/ recent C.diff s/p PO vanco and h/o ulcerative colitis  - F/u C.diff/ GI PCR  - Given h/o c.diff, will defer to GI and ID regarding starting Cipro / Flagyl at this time for other possible causes of colitis.  - Will c/w Dificid for now (non-formulary as per pharmacy, patient will have to take own med)  - IVF  - NPO for now  - Trend H/H   - Isolation precautions   - awaiting callback from GI    * Liver hypodense focus noted on CT  -F/u GI recommendations for further work up     * Hypokalemia  -Replete and recheck

## 2021-06-01 NOTE — PATIENT PROFILE ADULT - NSPRONUTRITIONRISK_GEN_A_NUR
Significant decrease of oral intake greater than 3 days prior to admission/Unintentional weight loss prior to admission

## 2021-06-01 NOTE — CONSULT NOTE ADULT - ASSESSMENT
26 y/o M w/ PMH of ulcerative colitis, for which he uses mesalamine admitted on 6/1 for evaluation of severe diarrhea and abdominal pain. He states he started to have diarrhea in Mid May, was diagnosed with CDiff and was on oral vancomycin for 10 days; he did not take any antibiotics preceding his Cdiff diagnosis. Once he finished the po vancomycin, his diarrhea returned and he was started on Dificid prescribed by his brother who is a gastroenterologist and which he was on upon admission. He notes that he ate [place] food that was leftover and smelled odd about 24-48 hours prior to his severe diarrhea. GI PCR testing done on this admission is positive for Enterotoxigenic E coli and imaging is positive for long segment colitis from transverse colon to rectum. He is still having numerous watery stools.     1. Patient admitted with colitis, history of CDiff colitis and found to have Enterotoxigenic E coli in the stool; also noted with leukocytosis most likely reactive to infection  - follow up cultures   - CDiff assay is pending  - serial cbc and monitor temperature   - iv hydration and supportive care   - reviewed prior medical records to evaluate for resistant or atypical pathogens   - contact isolation  - patient to continue Dificid for CDiff, even if the toxin assay is negative because will be starting on ciprofloxacin 500 mg po q day for three days total, exposure to quinolone promotes CDiff  - will be treating the Enterotoxigenic E coli, given that the patient is immunocompromised given his Ulcerative colitis, therefore the start of ciprofloxain  - discussed with patient and hospitalist
Patient with history of ulcerative proctitis and may have progression of disease.  He had stool for C. difficile positive which can be associated with 25% exacerbation of patients with ulcerative colitis.  CT scan reveals more progression of disease.  His stool for PCR is positive for enterotoxigenic E. coli.  At times, this may be a false positive.  It is difficult to delineate the etiology of his disease, with evidence of progression of his ulcerative colitis versus enterotoxigenic E. coli.    Would start mesalamine  Reasonable to treat for enterotoxigenic E. coli inpatient however, increased risk of C. difficile complications reviewed with him and he should be maintained on treatment.    Plan for colonoscopy to assess for possible chronic changes throughout the colon that will point more towards chronic ulcerative colitis that may be going on.    Discussed with patient.  Message left for brother and I will try him again      Steroids and ABR DW pt

## 2021-06-01 NOTE — H&P ADULT - ASSESSMENT
24 y/o M w/ PMH of ulcerative colitis, p/w diarrhea.    *Colitis on CT w/ recent C.diff s/p PO vanco and h/o ulcerative colitis  -F/u C.diff/ GI PCR  -Given h/o c.diff, will defer to GI and ID regarding starting Cipro / Flagyl at this time for other possible causes of colitis.  -Will c/w Dificid for now  -GI consult  -ID consult   -IVF  -NPO for now  -IV PPI   -Trend H/H   -Isolation precautions     *Liver hypodense focus noted on CT  -F/u GI recommendations for further work up     *Hypokalemia  -Replete and recheck     *DVT ppx  -SCDs      26 y/o M w/ PMH of ulcerative colitis, p/w diarrhea.    *Colitis on CT w/ recent C.diff s/p PO vanco and h/o ulcerative colitis  -F/u C.diff/ GI PCR  -Given h/o c.diff, will defer to GI and ID regarding starting Cipro / Flagyl at this time for other possible causes of colitis.  -Will c/w Dificid for now  -GI consult  -ID consult   -IVF  -NPO for now  -Trend H/H   -Isolation precautions     *Liver hypodense focus noted on CT  -F/u GI recommendations for further work up     *Hypokalemia  -Replete and recheck     *DVT ppx  -SCDs      24 y/o M w/ PMH of ulcerative colitis, p/w diarrhea.    *Colitis on CT w/ recent C.diff s/p PO vanco and h/o ulcerative colitis  -F/u C.diff/ GI PCR  -Given h/o c.diff, will defer to GI and ID regarding starting Cipro / Flagyl at this time for other possible causes of colitis.  -Will c/w Dificid for now (non-formulary as per pharmacy, patient will have to take own med)  -GI consult  -ID consult   -IVF  -NPO for now  -Trend H/H   -Isolation precautions     *Liver hypodense focus noted on CT  -F/u GI recommendations for further work up     *Hypokalemia  -Replete and recheck     *DVT ppx  -SCDs

## 2021-06-01 NOTE — H&P ADULT - HISTORY OF PRESENT ILLNESS
26 y/o M w/ PMH of ulcerative colitis, p/w diarrhea. States he has had diarrhea for 2 weeks, and completed PO vanco for c.diff and was recently started on Dificid by his brother, who is a gastroenterologist (not affiliated / [facility]). Patient also noticed seeing blood in stool, and has some abdominal pain. Presently patient is comfortable. Denies nausea, vomiting, fever, chills, cough, runny nose, sore throat, CP, SOB.    PSH: Denies    Social Hx: Denies x 3    Family Hx: Denies

## 2021-06-01 NOTE — ED ADULT NURSE REASSESSMENT NOTE - NS ED NURSE REASSESS COMMENT FT1
Patient received from previous nurse. Pt is A&Ox4, VSS on RA. Pt is resting comfortably in their bed at this time, denies any pain or discomfort at this time. Pt stated "I want to go home, I'm more comfortable at home." Covering MD called and will come evaluate he pt. Pending MD orders. Safety and comfort measures in place. Hourly rounding will be done on my time. Will continue to monitor.

## 2021-06-01 NOTE — CONSULT NOTE ADULT - SUBJECTIVE AND OBJECTIVE BOX
Patient is a 25y old  Male who presents with a chief complaint of diarrhea (2021 09:35)    HPI:  26 y/o M w/ PMH of ulcerative colitis, for which he uses mesalamine admitted on  for evaluation of severe diarrhea and abdominal pain. He states he started to have diarrhea in Mid May, was diagnosed with CDiff and was on oral vancomycin for 10 days; he did not take any antibiotics preceding his Cdiff diagnosis. Once he finished the po vancomycin, his diarrhea returned and he was started on Dificid prescribed by his brother who is a gastroenterologist and which he was on upon admission. He notes that he ate [place] food that was leftover and smelled odd about 24-48 hours prior to his severe diarrhea. GI PCR testing done on this admission is positive for Enterotoxigenic E coli and imaging is positive for long segment colitis from transverse colon to rectum. He is still having numerous watery stools.         PMH: as above  PSH: as above  Meds: per reconciliation sheet, noted below  MEDICATIONS  (STANDING):  ciprofloxacin     Tablet 500 milliGRAM(s) Oral daily  fidaxomicin Oral Tab/Cap - Peds 200 milliGRAM(s) Oral two times a day  heparin   Injectable 5000 Unit(s) SubCutaneous every 12 hours  mesalamine DR Capsule 800 milliGRAM(s) Oral three times a day  polyethylene glycol/electrolyte Solution 2000 milliLiter(s) Oral once    MEDICATIONS  (PRN):    Allergies    No Known Allergies    Intolerances      Social: no smoking, no alcohol, no illegal drugs; no recent travel, no exposure to TB  FAMILY HISTORY:     no history of premature cardiovascular disease in first degree relatives  ROS: the patient denies fever, no chills, no HA, no dizziness, no sore throat, no blurry vision, no CP, no palpitations, no SOB, no cough, no N/V, no dysuria, no leg pain, no claudication, no rash, no joint aches, no rectal pain or bleeding, no night sweats  All other systems reviewed and are negative    Vital Signs Last 24 Hrs  T(C): 36.8 (2021 11:20), Max: 37.1 (2021 07:10)  T(F): 98.2 (2021 11:20), Max: 98.8 (2021 07:10)  HR: 70 (2021 11:20) (65 - 89)  BP: 101/54 (2021 11:20) (100/50 - 118/75)  BP(mean): 67 (2021 11:20) (65 - 78)  RR: 14 (2021 11:20) (14 - 18)  SpO2: 100% (2021 11:20) (97% - 100%)  Daily Height in cm: 182.88 (31 May 2021 21:05)    Daily     PE:    Constitutional: frail looking  HEENT: NC/AT, EOMI, PERRLA, conjunctivae clear; ears and nose atraumatic; pharynx clear  Neck: supple; thyroid not palpable  Back: no tenderness  Respiratory: respiratory effort normal; clear to auscultation  Cardiovascular: S1S2 regular, no murmurs  Abdomen: soft, moderately  tender to palpation, not distended, positive BS; no liver or spleen organomegaly  Genitourinary: no suprapubic tenderness  Musculoskeletal: no muscle tenderness, no joint swelling or tenderness  Neurological/ Psychiatric: AxOx3, judgement and insight normal;  moving all extremities  Skin: no rashes; no palpable lesions    Labs: all available labs reviewed                        .   1242 )-----------( 485      ( 2021 07:46 )             32.9     06-01    143  |  111<H>  |  5<L>  ----------------------------<  87  3.1<L>   |  26  |  0.91    Ca    8.9      2021 07:46  Phos  3.0     06-01  Mg     2.2     06-01    TPro  6.2  /  Alb  2.8<L>  /  TBili  0.2  /  DBili  x   /  AST  10<L>  /  ALT  14  /  AlkPhos  50  06-01     LIVER FUNCTIONS - ( 2021 07:46 )  Alb: 2.8 g/dL / Pro: 6.2 gm/dL / ALK PHOS: 50 U/L / ALT: 14 U/L / AST: 10 U/L / GGT: x           Urinalysis Basic - ( 2021 00:05 )    Color: Yellow / Appearance: Clear / S.005 / pH: x  Gluc: x / Ketone: Negative  / Bili: Negative / Urobili: Negative mg/dL   Blood: x / Protein: Negative mg/dL / Nitrite: Negative   Leuk Esterase: Negative / RBC: x / WBC x   Sq Epi: x / Non Sq Epi: x / Bacteria: x        Culture Results:   Enterotoxigenic E. coli (ETEC)  DETECTED by PCR  *******Please Note:*******  GI panel PCR evaluates for:  Campylobacter, Plesiomonas shigelloides, Salmonella,  Vibrio, Yersinia enterocolitica, Enteroaggregative  Escherichia coli (EAEC), Enteropathogenic E.coli (EPEC),  Enterotoxigenic E. coli (ETEC) lt/st, Shiga-like  toxin-producing E. coli (STEC) stx1/stx2,  Shigella/ Enteroinvasive E. coli (EIEC), Cryptosporidium,  Cyclospora cayetanensis, Entamoeba histolytica,  Giardia lamblia, Adenovirus F 40/41, Astrovirus,  Norovirus GI/GII, Rotavirus A, Sapovirus ( @ 02:54)            < from: CT Abdomen and Pelvis w/ Oral Cont and w/ IV Cont (21 @ 01:34) >    EXAM:  CT ABDOMEN AND PELVIS OC IC                            PROCEDURE DATE:  2021          INTERPRETATION:  CLINICAL INFORMATION: Recent treatment for C. difficile colitis with worsening abdominal pain and bloating.    COMPARISON: None.    CONTRAST/COMPLICATIONS:  IV Contrast: Omnipaque 350  90 cc administered   10 cc discarded  Oral Contrast: Omnipaque 300  Complications: None reported at time of study completion    PROCEDURE:  CT of the Abdomen and Pelvis was performed.  Sagittal and coronal reformats were performed.    FINDINGS:  LOWER CHEST: Within normal limits.    LIVER: Subcentimeter indeterminant hypodense focus in the caudate lobe.  BILE DUCTS: Normal caliber.  GALLBLADDER: Within normal limits.  SPLEEN: Within normal limits.  PANCREAS: Within normal limits.  ADRENALS: Within normal limits.  KIDNEYS/URETERS: Within normal limits.    BLADDER: Within normal limits.  REPRODUCTIVE ORGANS: Prostate within normal limits.    BOWEL: No bowel obstruction. Appendix is normal. Long segment colonic wall thickening extending from transverse colon to rectum.  PERITONEUM: No ascites.  VESSELS: Within normal limits.  RETROPERITONEUM/LYMPH NODES: No lymphadenopathy.  ABDOMINAL WALL: Within normal limits.  BONES: Within normal limits.    IMPRESSION:  Long segment colitis, likely infectious.    < end of copied text >              Radiology: all available radiological tests reviewed    Advanced directives addressed: full resuscitation
Patient is a 25y old  Male who presents with a chief complaint of diarrhea (01 Jun 2021 14:54)      HPI:  24 y/o M w/ PMH of ulcerative colitis, p/w diarrhea. States he has had diarrhea for 2 weeks, and completed PO vanco for c.diff and was recently started on Dificid by his brother, who is a gastroenterologist (not affiliated / [facility]). Patient also noticed seeing blood in stool, and has some abdominal pain. Presently patient is comfortable. Denies nausea, vomiting, fever, chills, cough, runny nose, sore throat, CP, SOB.        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  He was treated with mesalamine enemas intermittently and states that has been noncompliant with him.  Every once in a while, when he notes blood in the stool again, he uses mesalamine enemas.    More recently, he had increasing symptoms with diarrhea that became bloody.  Had a stool for C. difficile sent by his brother with his gastroenterologist and this was positive.  Patient was treated with a course of vancomycin and his diarrhea went from many times a day to approximately 10 times a day.  Secondary to continued diarrhea came to the hospital.  He felt fatigued and dehydrated.  Did notice some blood in the stool.  He also started second therapy for C. difficile.    Stool for GI PCR notable for enterotoxigenic E. coli and progression of colitis is also appreciated on CAT scan that I reviewed.    PSH: Denies    Social Hx: Denies x 3    Family Hx: Denies    (01 Jun 2021 04:46)      PAST MEDICAL & SURGICAL HISTORY:  No significant past surgical history        MEDICATIONS  (STANDING):  ciprofloxacin     Tablet 500 milliGRAM(s) Oral daily  fidaxomicin Oral Tab/Cap - Peds 200 milliGRAM(s) Oral two times a day  heparin   Injectable 5000 Unit(s) SubCutaneous every 12 hours  mesalamine DR Capsule 800 milliGRAM(s) Oral three times a day  polyethylene glycol/electrolyte Solution 2000 milliLiter(s) Oral once  sodium chloride 0.9%. 1000 milliLiter(s) (150 mL/Hr) IV Continuous <Continuous>    MEDICATIONS  (PRN):      Allergies    No Known Allergies    Intolerances        SOCIAL HISTORY:med student    FAMILY HISTORY:[place]      REVIEW OF SYSTEMS:    CONSTITUTIONAL: No weakness, fevers or chills  EYES/ENT: No visual changes;  No vertigo or throat pain   NECK: No pain or stiffness  RESPIRATORY: No cough, wheezing, hemoptysis; No shortness of breath  CARDIOVASCULAR: No chest pain or palpitations  GENITOURINARY: No dysuria, frequency or hematuria  NEUROLOGICAL: No numbness or weakness  SKIN: No itching, burning, rashes, or lesions   All other review of systems is negative unless indicated above.    Vital Signs Last 24 Hrs  T(C): 36.7 (01 Jun 2021 16:30), Max: 37.1 (01 Jun 2021 07:10)  T(F): 98 (01 Jun 2021 16:30), Max: 98.8 (01 Jun 2021 07:10)  HR: 97 (01 Jun 2021 16:30) (65 - 97)  BP: 115/60 (01 Jun 2021 16:30) (100/50 - 118/75)  BP(mean): 75 (01 Jun 2021 16:30) (65 - 78)  RR: 14 (01 Jun 2021 11:20) (14 - 18)  SpO2: 95% (01 Jun 2021 16:30) (95% - 100%)    PHYSICAL EXAM:    Constitutional: NAD, well-developed  HEENT: EOMI, throat clear  Neck: No LAD, supple  Respiratory: CTA and P  Cardiovascular: S1 and S2, RRR, no M  Gastrointestinal: BS+, soft, mild lower tend/ND, neg HSM,  Extremities: No peripheral edema, neg clubing, cyanosis  Vascular: 2+ peripheral pulses  Neurological: A/O x 3, no focal deficits  Psychiatric: Normal mood, normal affect  Skin: No rashes    LABS:  CBC Full  -  ( 01 Jun 2021 07:46 )  WBC Count : 12.42 K/uL  RBC Count : 3.71 M/uL  Hemoglobin : 11.1 g/dL  Hematocrit : 32.9 %  Platelet Count - Automated : 485 K/uL  Mean Cell Volume : 88.7 fl  Mean Cell Hemoglobin : 29.9 pg  Mean Cell Hemoglobin Concentration : 33.7 gm/dL  Auto Neutrophil # : 7.46 K/uL  Auto Lymphocyte # : 2.37 K/uL  Auto Monocyte # : 1.85 K/uL  Auto Eosinophil # : 0.63 K/uL  Auto Basophil # : 0.07 K/uL  Auto Neutrophil % : 60.0 %  Auto Lymphocyte % : 19.1 %  Auto Monocyte % : 14.9 %  Auto Eosinophil % : 5.1 %  Auto Basophil % : 0.6 %    06-01    143  |  111<H>  |  5<L>  ----------------------------<  87  3.1<L>   |  26  |  0.91    Ca    8.9      01 Jun 2021 07:46  Phos  3.0     06-01  Mg     2.2     06-01    TPro  6.2  /  Alb  2.8<L>  /  TBili  0.2  /  DBili  x   /  AST  10<L>  /  ALT  14  /  AlkPhos  50  06-01    PT/INR - ( 01 Jun 2021 07:46 )   PT: 14.3 sec;   INR: 1.25 ratio         PTT - ( 01 Jun 2021 07:46 )  PTT:27.0 sec        RADIOLOGY & ADDITIONAL STUDIES:  CT noted

## 2021-06-02 LAB
ANION GAP SERPL CALC-SCNC: 8 MMOL/L — SIGNIFICANT CHANGE UP (ref 5–17)
BUN SERPL-MCNC: 2 MG/DL — LOW (ref 7–23)
CALCIUM SERPL-MCNC: 9 MG/DL — SIGNIFICANT CHANGE UP (ref 8.5–10.1)
CHLORIDE SERPL-SCNC: 110 MMOL/L — HIGH (ref 96–108)
CO2 SERPL-SCNC: 24 MMOL/L — SIGNIFICANT CHANGE UP (ref 22–31)
COVID-19 SPIKE DOMAIN AB INTERP: POSITIVE
COVID-19 SPIKE DOMAIN ANTIBODY RESULT: >250 U/ML — HIGH
CREAT SERPL-MCNC: 1.04 MG/DL — SIGNIFICANT CHANGE UP (ref 0.5–1.3)
CULTURE RESULTS: NO GROWTH — SIGNIFICANT CHANGE UP
GLUCOSE SERPL-MCNC: 136 MG/DL — HIGH (ref 70–99)
HCT VFR BLD CALC: 36.9 % — LOW (ref 39–50)
HGB BLD-MCNC: 12.2 G/DL — LOW (ref 13–17)
MCHC RBC-ENTMCNC: 29.7 PG — SIGNIFICANT CHANGE UP (ref 27–34)
MCHC RBC-ENTMCNC: 33.1 GM/DL — SIGNIFICANT CHANGE UP (ref 32–36)
MCV RBC AUTO: 89.8 FL — SIGNIFICANT CHANGE UP (ref 80–100)
PLATELET # BLD AUTO: 369 K/UL — SIGNIFICANT CHANGE UP (ref 150–400)
POTASSIUM SERPL-MCNC: 3.1 MMOL/L — LOW (ref 3.5–5.3)
POTASSIUM SERPL-SCNC: 3.1 MMOL/L — LOW (ref 3.5–5.3)
RBC # BLD: 4.11 M/UL — LOW (ref 4.2–5.8)
RBC # FLD: 13.2 % — SIGNIFICANT CHANGE UP (ref 10.3–14.5)
SARS-COV-2 IGG+IGM SERPL QL IA: >250 U/ML — HIGH
SARS-COV-2 IGG+IGM SERPL QL IA: POSITIVE
SODIUM SERPL-SCNC: 142 MMOL/L — SIGNIFICANT CHANGE UP (ref 135–145)
SPECIMEN SOURCE: SIGNIFICANT CHANGE UP
WBC # BLD: 8.68 K/UL — SIGNIFICANT CHANGE UP (ref 3.8–10.5)
WBC # FLD AUTO: 8.68 K/UL — SIGNIFICANT CHANGE UP (ref 3.8–10.5)

## 2021-06-02 PROCEDURE — 88305 TISSUE EXAM BY PATHOLOGIST: CPT | Mod: 26

## 2021-06-02 PROCEDURE — 99232 SBSQ HOSP IP/OBS MODERATE 35: CPT

## 2021-06-02 RX ORDER — POTASSIUM CHLORIDE 20 MEQ
40 PACKET (EA) ORAL EVERY 4 HOURS
Refills: 0 | Status: COMPLETED | OUTPATIENT
Start: 2021-06-02 | End: 2021-06-02

## 2021-06-02 RX ADMIN — Medication 500 MILLIGRAM(S): at 11:28

## 2021-06-02 RX ADMIN — FIDAXOMICIN 200 MILLIGRAM(S): 200 GRANULE, FOR SUSPENSION ORAL at 21:20

## 2021-06-02 RX ADMIN — HEPARIN SODIUM 5000 UNIT(S): 5000 INJECTION INTRAVENOUS; SUBCUTANEOUS at 21:20

## 2021-06-02 RX ADMIN — FIDAXOMICIN 200 MILLIGRAM(S): 200 GRANULE, FOR SUSPENSION ORAL at 11:28

## 2021-06-02 RX ADMIN — Medication 800 MILLIGRAM(S): at 21:20

## 2021-06-02 RX ADMIN — Medication 800 MILLIGRAM(S): at 05:22

## 2021-06-02 RX ADMIN — Medication 40 MILLIEQUIVALENT(S): at 14:27

## 2021-06-02 RX ADMIN — Medication 800 MILLIGRAM(S): at 14:28

## 2021-06-02 RX ADMIN — Medication 40 MILLIEQUIVALENT(S): at 18:06

## 2021-06-02 RX ADMIN — Medication 40 MILLIEQUIVALENT(S): at 11:28

## 2021-06-02 NOTE — DIETITIAN INITIAL EVALUATION ADULT. - ORAL INTAKE PTA/DIET HISTORY
Pt with fair/good PO, but fasted one month during Ramadan.  Then had colitis flare-up w diarrhea, poor PO intake.

## 2021-06-02 NOTE — DIETITIAN INITIAL EVALUATION ADULT. - MALNUTRITION
Pt meets criteria for severe protein-calorie malnutrition in context of chronic disease Patient meets criteria for moderate protein-calorie malnutrition in context of chronic disease.  PO intake < 75 % nutritional needs > one month.  Wt loss of 6% in 6 weeks.  Decreased PO Intake partly intentional, pt observed Ramadan and fasted during day. Pt meets criteria for moderate protein-calorie malnutrition in context of chronic disease

## 2021-06-02 NOTE — DIETITIAN INITIAL EVALUATION ADULT. - NAME AND PHONE
Heather Weathers RDN, CDN, [facility]      [phone]   [email or web address]

## 2021-06-02 NOTE — DIETITIAN INITIAL EVALUATION ADULT. - OTHER INFO
24 y/o M w/ PMH of ulcerative colitis, p/w diarrhea. States he has had diarrhea for 2 weeks, and completed PO vanco for c.diff and was recently started on Dificid by his brother, who is a gastroenterologist (not affiliated / [facility]). Patient also noticed seeing blood in stool, and has some abdominal pain. Presently patient is comfortable. Denies nausea, vomiting, fever, chills, cough, runny nose, sore throat, CP, SOB. 26 y/o M w/ PMH of ulcerative colitis, p/w diarrhea. States he has had diarrhea for 2 weeks, and completed PO vanco for c.diff and was recently started on Dificid by his brother, who is a gastroenterologist (not affiliated / [facility]). Patient also noticed seeing blood in stool, and has some abdominal pain. Presently patient is comfortable. Denies nausea, vomiting, fever, chills, cough, runny nose, sore throat, CP, SOB.  Visited with pt, who was in chair, finished a meal.  Pt is Halal.  Pt reports diarrhea resolving somewhat.  Pt reports losing weight during Ramadan, but wt loss continued after with onset of diarrhea.  No issues chew/swallow  No other GI issues  Colonoscopy performed

## 2021-06-02 NOTE — DIETITIAN NUTRITION RISK NOTIFICATION - ADDITIONAL COMMENTS/DIETITIAN RECOMMENDATIONS
Suggest change diet to low fiber due to diarrhea  Record PO intake in EMR after each meal (nursing.)   Add ensure enlive 8 oz tid  add gelatein bid  add MVI w minerals  Tray set-up  Monitor PO intake, tolerance, labs and weight.

## 2021-06-02 NOTE — DIETITIAN INITIAL EVALUATION ADULT. - ADD RECOMMEND
Suggest change diet to low fiber.  Record PO intake in EMR after each meal (nursing.) Monitor PO intake, tolerance, labs and weight.

## 2021-06-02 NOTE — PROGRESS NOTE ADULT - SUBJECTIVE AND OBJECTIVE BOX
Date of service: 06-02-21 @ 16:58    Patient sitting in chair; had colonoscopy earlier today  Notes he feels better than admission, diarrhea slightly less        ROS: no fever or chills; denies dizziness, no HA, no SOB or cough, no abdominal pain, no constipation; no dysuria, no urinary frequency, no legs pain, no rashes    MEDICATIONS  (STANDING):  ciprofloxacin     Tablet 500 milliGRAM(s) Oral daily  fidaxomicin Oral Tab/Cap - Peds 200 milliGRAM(s) Oral two times a day  heparin   Injectable 5000 Unit(s) SubCutaneous every 12 hours  mesalamine DR Capsule 800 milliGRAM(s) Oral three times a day  potassium chloride    Tablet ER 40 milliEquivalent(s) Oral every 4 hours  sodium chloride 0.9%. 1000 milliLiter(s) (100 mL/Hr) IV Continuous <Continuous>    MEDICATIONS  (PRN):      Vital Signs Last 24 Hrs  T(C): 36.8 (02 Jun 2021 16:00), Max: 36.8 (01 Jun 2021 22:58)  T(F): 98.2 (02 Jun 2021 16:00), Max: 98.3 (01 Jun 2021 22:58)  HR: 77 (02 Jun 2021 16:00) (71 - 91)  BP: 97/63 (02 Jun 2021 16:00) (95/48 - 123/78)  BP(mean): --  RR: 17 (02 Jun 2021 16:00) (15 - 17)  SpO2: 100% (02 Jun 2021 16:00) (99% - 100%)        Physical Exam:          Constitutional: frail looking  HEENT: NC/AT, EOMI, PERRLA, conjunctivae clear; ears and nose atraumatic; pharynx clear  Neck: supple; thyroid not palpable  Back: no tenderness  Respiratory: respiratory effort normal; clear to auscultation  Cardiovascular: S1S2 regular, no murmurs  Abdomen: soft, moderately  tender to palpation, not distended, positive BS; no liver or spleen organomegaly  Genitourinary: no suprapubic tenderness  Musculoskeletal: no muscle tenderness, no joint swelling or tenderness  Neurological/ Psychiatric: AxOx3, judgement and insight normal;  moving all extremities  Skin: no rashes; no palpable lesions    Labs: all available labs reviewed                            Labs:                        12.2   8.68  )-----------( 369      ( 02 Jun 2021 10:08 )             36.9     06-02    142  |  110<H>  |  2<L>  ----------------------------<  136<H>  3.1<L>   |  24  |  1.04    Ca    9.0      02 Jun 2021 10:08  Phos  3.0     06-01  Mg     2.2     06-01    TPro  6.2  /  Alb  2.8<L>  /  TBili  0.2  /  DBili  x   /  AST  10<L>  /  ALT  14  /  AlkPhos  50  06-01         Clostridium difficile Toxin by PCR (06.01.21 @ 02:54)    Clostridium difficile Toxin by PCR: The results of this test should be interpreted with consideration of all  clinical and laboratory findings. This test determines the presence of  the C. difficile tcdB gene at a given time and is not intended to  identify antibiotic associated disease or C. difficile infection without  clinical context.  Successful treatment is based on the resolution of clinical symptoms.  This test should not be used as a "test of cure" because C. difficile DNA  will persist after successful treatment. Repeat testing will not be  permitted.    This test is performed on the BD MAX system using Real-Time PCR and  fluorogenic target-specific hybridization.    C Diff by PCR Result: NotDetec                Cultures:       GI PCR Panel, Stool (collected 06-01-21 @ 02:54)  Source: .Stool None  Final Report (06-01-21 @ 13:17):    Enterotoxigenic E. coli (ETEC)    DETECTED by PCR    *******Please Note:*******    GI panel PCR evaluates for:    Campylobacter, Plesiomonas shigelloides, Salmonella,    Vibrio, Yersinia enterocolitica, Enteroaggregative    Escherichia coli (EAEC), Enteropathogenic E.coli (EPEC),    Enterotoxigenic E. coli (ETEC) lt/st, Shiga-like    toxin-producing E. coli (STEC) stx1/stx2,    Shigella/ Enteroinvasive E. coli (EIEC), Cryptosporidium,    Cyclospora cayetanensis, Entamoeba histolytica,    Giardia lamblia, Adenovirus F 40/41, Astrovirus,    Norovirus GI/GII, Rotavirus A, Sapovirus    Culture - Urine (collected 06-01-21 @ 00:05)  Source: .Urine None  Final Report (06-02-21 @ 00:23):    No growth            < from: CT Abdomen and Pelvis w/ Oral Cont and w/ IV Cont (06.01.21 @ 01:34) >    EXAM:  CT ABDOMEN AND PELVIS OC IC                            PROCEDURE DATE:  06/01/2021          INTERPRETATION:  CLINICAL INFORMATION: Recent treatment for C. difficile colitis with worsening abdominal pain and bloating.    COMPARISON: None.    CONTRAST/COMPLICATIONS:  IV Contrast: Omnipaque 350  90 cc administered   10 cc discarded  Oral Contrast: Omnipaque 300  Complications: None reported at time of study completion    PROCEDURE:  CT of the Abdomen and Pelvis was performed.  Sagittal and coronal reformats were performed.    FINDINGS:  LOWER CHEST: Within normal limits.    LIVER: Subcentimeter indeterminant hypodense focus in the caudate lobe.  BILE DUCTS: Normal caliber.  GALLBLADDER: Within normal limits.  SPLEEN: Within normal limits.  PANCREAS: Within normal limits.  ADRENALS: Within normal limits.  KIDNEYS/URETERS: Within normal limits.    BLADDER: Within normal limits.  REPRODUCTIVE ORGANS: Prostate within normal limits.    BOWEL: No bowel obstruction. Appendix is normal. Long segment colonic wall thickening extending from transverse colon to rectum.  PERITONEUM: No ascites.  VESSELS: Within normal limits.  RETROPERITONEUM/LYMPH NODES: No lymphadenopathy.  ABDOMINAL WALL: Within normal limits.  BONES: Within normal limits.    IMPRESSION:  Long segment colitis, likely infectious.    < end of copied text >              Radiology: all available radiological tests reviewed    Advanced directives addressed: full resuscitation

## 2021-06-02 NOTE — DIETITIAN INITIAL EVALUATION ADULT. - PERTINENT LABORATORY DATA
06-02 Na142 mmol/L Glu 136 mg/dL<H> K+ 3.1 mmol/L<L> Cr  1.04 mg/dL BUN 2 mg/dL<L> Phos n/a   Alb n/a   PAB n/a       06-02    142  |  110<H>  |  2<L>  ----------------------------<  136<H>  3.1<L>   |  24  |  1.04    Ca    9.0      02 Jun 2021 10:08  Phos  3.0     06-01      Mg     2.2     06-01    TPro  6.2  /  Alb  2.8<L>  /  TBili  0.2  /  DBili  x   /  AST  10<L>  /  ALT  14  /  AlkPhos  50  06-01  BMI: BMI (kg/m2): 21.3 (06-01-21 @ 14:54)  HbA1c:   Glucose:   BP: 95/48 (06-02-21 @ 08:56) (95/48 - 123/78)  Lipid Panel:

## 2021-06-02 NOTE — DIETITIAN INITIAL EVALUATION ADULT. - PERTINENT MEDS FT
MEDICATIONS  (STANDING):  ciprofloxacin     Tablet 500 milliGRAM(s) Oral daily  fidaxomicin Oral Tab/Cap - Peds 200 milliGRAM(s) Oral two times a day  heparin   Injectable 5000 Unit(s) SubCutaneous every 12 hours  mesalamine DR Capsule 800 milliGRAM(s) Oral three times a day  potassium chloride    Tablet ER 40 milliEquivalent(s) Oral every 4 hours  sodium chloride 0.9%. 1000 milliLiter(s) (100 mL/Hr) IV Continuous <Continuous>    MEDICATIONS  (PRN):

## 2021-06-02 NOTE — PROGRESS NOTE ADULT - SUBJECTIVE AND OBJECTIVE BOX
Reason for Admission: diarrhea  History of Present Illness:   24 y/o M w/ PMH of ulcerative colitis, p/w diarrhea. States he has had diarrhea for 2 weeks, and completed PO vanco for c.diff and was recently started on Dificid by his brother, who is a gastroenterologist (not affiliated [facility]). Patient also noticed seeing blood in stool, and has some abdominal pain. Presently patient is comfortable. Denies nausea, vomiting, fever, chills, cough, runny nose, sore throat, CP, SOB.    Medical progress:  Patient feels well. s/p colonoscopy -  diffuse colitis. Patient's care discussed with Dr. DURÁN -  at this time -  plan is to continue with cipro /  start steroids in the am. Colonoscopy report provided to the patient.   Complaints: feels well today  Consult: Care discussed with ID /  Dr. DURÁN    REVIEW OF SYSTEMS:  General: NAD, hemodynamically stable, (-)  fever, (-) chills, (-) weakness  HEENT:  Eyes:  No visual loss, blurred vision, double vision or yellow sclerae. Ears, Nose, Throat:  No hearing loss, sneezing, congestion, runny nose or sore throat.  SKIN:  No rash or itching.  CARDIOVASCULAR:  No chest pain, chest pressure or chest discomfort. No palpitations or edema.  RESPIRATORY:  No shortness of breath, cough or sputum.  GASTROINTESTINAL:  No anorexia, nausea, vomiting or diarrhea. No abdominal pain or blood.  NEUROLOGICAL:  No headache, dizziness, syncope, paralysis, ataxia, numbness or tingling in the extremities. No change in bowel or bladder control.  MUSCULOSKELETAL:  No muscle, back pain, joint pain or stiffness.  HEMATOLOGIC:  No anemia, bleeding or bruising.  LYMPHATICS:  No enlarged nodes. No history of splenectomy.  ENDOCRINOLOGIC:  No reports of sweating, cold or heat intolerance. No polyuria or polydipsia.  ALLERGIES:  No history of asthma, hives, eczema or rhinitis.    Physical Exam:   GENERAL APPEARANCE:  NAD, hemodynamically stable  ICU Vital Signs Last 24 Hrs  T(C): 36.7 (2021 08:56), Max: 36.8 (2021 22:58)  T(F): 98.1 (2021 08:56), Max: 98.3 (2021 22:58)  HR: 75 (2021 08:56) (71 - 97)  BP: 95/48 (2021 08:56) (95/48 - 123/78)  BP(mean): 75 (2021 16:30) (75 - 75)  ABP: --  ABP(mean): --  RR: 16 (2021 08:56) (15 - 16)  SpO2: 100% (2021 08:56) (95% - 100%)    HEENT:  Head is normocephalic    Skin:  Warm and dry without any rash   NECK:  Supple without lymphadenopathy.   HEART:  Regular rate and rhythm. normal S1 and S2, No M/R/G  LUNGS:  Good ins/exp effort, no W/R/R/C  ABDOMEN:  Soft, nontender, nondistended with good bowel sounds heard  EXTREMITIES:  Without cyanosis, clubbing or edema.   NEUROLOGICAL:  Gross nonfocal       CBC Full  -  ( 2021 10:08 )  WBC Count : 8.68 K/uL  RBC Count : 4.11 M/uL  Hemoglobin : 12.2 g/dL  Hematocrit : 36.9 %  Platelet Count - Automated : 369 K/uL  Mean Cell Volume : 89.8 fl  Mean Cell Hemoglobin : 29.7 pg  Mean Cell Hemoglobin Concentration : 33.1 gm/dL  Auto Neutrophil # : x  Auto Lymphocyte # : x  Auto Monocyte # : x  Auto Eosinophil # : x  Auto Basophil # : x  Auto Neutrophil % : x  Auto Lymphocyte % : x  Auto Monocyte % : x  Auto Eosinophil % : x  Auto Basophil % : x    PT/INR - ( 2021 07:46 )   PT: 14.3 sec;   INR: 1.25 ratio         PTT - ( 2021 07:46 )  PTT:27.0 sec  Urinalysis Basic - ( 2021 00:05 )    Color: Yellow / Appearance: Clear / S.005 / pH: x  Gluc: x / Ketone: Negative  / Bili: Negative / Urobili: Negative mg/dL   Blood: x / Protein: Negative mg/dL / Nitrite: Negative   Leuk Esterase: Negative / RBC: x / WBC x   Sq Epi: x / Non Sq Epi: x / Bacteria: x      06-02    142  |  110<H>  |  2<L>  ----------------------------<  136<H>  3.1<L>   |  24  |  1.04    Ca    9.0      2021 10:08  Phos  3.0     06-01  Mg     2.2     06-01    TPro  6.2  /  Alb  2.8<L>  /  TBili  0.2  /  DBili  x   /  AST  10<L>  /  ALT  14  /  AlkPhos  50  06-        24 y/o M w/ PMH of ulcerative colitis, p/w diarrhea.    * Colitis differential includes and is not limited to infectious /  informatory    - CT w/ recent C.diff s/p PO vanco and h/o ulcerative colitis  - c. diff PCR -  no growth  - GI PCR -  noted ETEC - started on po Cipro 2/3 days  - Will c/w Dificid for now (non-formulary as per pharmacy, patient will have to take own med)  - stop IV hydration  - restart diet  - Isolation precautions   - Care discussed with GI    * Liver hypodense focus noted on CT  -F/u GI recommendations for further work up   - according to the patient -  he had a recent CT scan - which did not show any abnormalities around the liver region.     * Hypokalemia poor po intake /  aggressive hydration  - Replete and recheck     DVT proph  - heparin

## 2021-06-03 VITALS
SYSTOLIC BLOOD PRESSURE: 92 MMHG | HEART RATE: 65 BPM | OXYGEN SATURATION: 98 % | RESPIRATION RATE: 18 BRPM | TEMPERATURE: 98 F | DIASTOLIC BLOOD PRESSURE: 47 MMHG

## 2021-06-03 LAB
CULTURE RESULTS: SIGNIFICANT CHANGE UP
SPECIMEN SOURCE: SIGNIFICANT CHANGE UP
SURGICAL PATHOLOGY STUDY: SIGNIFICANT CHANGE UP

## 2021-06-03 PROCEDURE — 99239 HOSP IP/OBS DSCHRG MGMT >30: CPT

## 2021-06-03 RX ORDER — MESALAMINE 400 MG
2 TABLET, DELAYED RELEASE (ENTERIC COATED) ORAL
Qty: 0 | Refills: 0 | DISCHARGE
Start: 2021-06-03

## 2021-06-03 RX ADMIN — Medication 500 MILLIGRAM(S): at 09:56

## 2021-06-03 RX ADMIN — FIDAXOMICIN 200 MILLIGRAM(S): 200 GRANULE, FOR SUSPENSION ORAL at 09:56

## 2021-06-03 RX ADMIN — Medication 800 MILLIGRAM(S): at 05:39

## 2021-06-03 NOTE — DISCHARGE NOTE PROVIDER - DETAILS OF MALNUTRITION DIAGNOSIS/DIAGNOSES
This patient has been assessed with a concern for Malnutrition and was treated during this hospitalization for the following Nutrition diagnosis/diagnoses:     -  06/02/2021: Severe protein-calorie malnutrition

## 2021-06-03 NOTE — DISCHARGE NOTE NURSING/CASE MANAGEMENT/SOCIAL WORK - PATIENT PORTAL LINK FT
You can access the FollowMyHealth Patient Portal offered by [facility] by registering at the following website: [email or web address]. By joining [employer]’s FollowMyHealth portal, you will also be able to view your health information using other applications (apps) compatible with our system.

## 2021-06-03 NOTE — DISCHARGE NOTE PROVIDER - CARE PROVIDER_API CALL
Follow up with your gastroenterologist within 1 week,   Phone: (   )    -  Fax: (   )    -  Follow Up Time:

## 2021-06-03 NOTE — DISCHARGE NOTE PROVIDER - PROVIDER TOKENS
FREE:[LAST:[Follow up with your gastroenterologist within 1 week],PHONE:[(   )    -],FAX:[(   )    -]]

## 2021-06-03 NOTE — DISCHARGE NOTE PROVIDER - NSDCMRMEDTOKEN_GEN_ALL_CORE_FT
Dificid 200 mg oral tablet: 1 tab(s) orally 2 times a day  mesalamine 400 mg oral delayed release capsule: 2 cap(s) orally 3 times a day

## 2021-06-03 NOTE — PROGRESS NOTE ADULT - SUBJECTIVE AND OBJECTIVE BOX
Date of service: 06-03-21 @ 13:19      Patient improved, afebrile, only one bowel movement today  Will be going home      ROS: no fever or chills; denies dizziness, no HA, no SOB or cough, no abdominal pain, no diarrhea or constipation; no dysuria, no urinary frequency, no legs pain, no rashes    MEDICATIONS  (STANDING):  ciprofloxacin     Tablet 500 milliGRAM(s) Oral daily  fidaxomicin Oral Tab/Cap - Peds 200 milliGRAM(s) Oral two times a day  heparin   Injectable 5000 Unit(s) SubCutaneous every 12 hours  mesalamine DR Capsule 800 milliGRAM(s) Oral three times a day  sodium chloride 0.9%. 1000 milliLiter(s) (100 mL/Hr) IV Continuous <Continuous>    MEDICATIONS  (PRN):      Vital Signs Last 24 Hrs  T(C): 36.5 (03 Jun 2021 09:04), Max: 37.1 (02 Jun 2021 20:50)  T(F): 97.7 (03 Jun 2021 09:04), Max: 98.8 (02 Jun 2021 20:50)  HR: 65 (03 Jun 2021 09:04) (65 - 85)  BP: 92/47 (03 Jun 2021 09:04) (92/47 - 114/80)  BP(mean): --  RR: 18 (03 Jun 2021 09:04) (17 - 18)  SpO2: 98% (03 Jun 2021 09:04) (98% - 100%)        Physical Exam:          Constitutional: frail looking  HEENT: NC/AT, EOMI, PERRLA, conjunctivae clear; ears and nose atraumatic; pharynx clear  Neck: supple; thyroid not palpable  Back: no tenderness  Respiratory: respiratory effort normal; clear to auscultation  Cardiovascular: S1S2 regular, no murmurs  Abdomen: soft, moderately  tender to palpation, not distended, positive BS; no liver or spleen organomegaly  Genitourinary: no suprapubic tenderness  Musculoskeletal: no muscle tenderness, no joint swelling or tenderness  Neurological/ Psychiatric: AxOx3, judgement and insight normal;  moving all extremities  Skin: no rashes; no palpable lesions    Labs: all available labs reviewed                          Labs:                        12.2   8.68  )-----------( 369      ( 02 Jun 2021 10:08 )             36.9     06-02    142  |  110<H>  |  2<L>  ----------------------------<  136<H>  3.1<L>   |  24  |  1.04    Ca    9.0      02 Jun 2021 10:08             Cultures:       GI PCR Panel, Stool (collected 06-01-21 @ 02:54)  Source: .Stool None  Final Report (06-01-21 @ 13:17):    Enterotoxigenic E. coli (ETEC)    DETECTED by PCR    *******Please Note:*******    GI panel PCR evaluates for:    Campylobacter, Plesiomonas shigelloides, Salmonella,    Vibrio, Yersinia enterocolitica, Enteroaggregative    Escherichia coli (EAEC), Enteropathogenic E.coli (EPEC),    Enterotoxigenic E. coli (ETEC) lt/st, Shiga-like    toxin-producing E. coli (STEC) stx1/stx2,    Shigella/ Enteroinvasive E. coli (EIEC), Cryptosporidium,    Cyclospora cayetanensis, Entamoeba histolytica,    Giardia lamblia, Adenovirus F 40/41, Astrovirus,    Norovirus GI/GII, Rotavirus A, Sapovirus    Culture - Urine (collected 06-01-21 @ 00:05)  Source: .Urine None  Final Report (06-02-21 @ 00:23):    No growth                 Clostridium difficile Toxin by PCR (06.01.21 @ 02:54)    Clostridium difficile Toxin by PCR: The results of this test should be interpreted with consideration of all  clinical and laboratory findings. This test determines the presence of  the C. difficile tcdB gene at a given time and is not intended to  identify antibiotic associated disease or C. difficile infection without  clinical context.  Successful treatment is based on the resolution of clinical symptoms.  This test should not be used as a "test of cure" because C. difficile DNA  will persist after successful treatment. Repeat testing will not be  permitted.    This test is performed on the BD MAX system using Real-Time PCR and  fluorogenic target-specific hybridization.    C Diff by PCR Result: NotDetec                        < from: CT Abdomen and Pelvis w/ Oral Cont and w/ IV Cont (06.01.21 @ 01:34) >    EXAM:  CT ABDOMEN AND PELVIS OC IC                            PROCEDURE DATE:  06/01/2021          INTERPRETATION:  CLINICAL INFORMATION: Recent treatment for C. difficile colitis with worsening abdominal pain and bloating.    COMPARISON: None.    CONTRAST/COMPLICATIONS:  IV Contrast: Omnipaque 350  90 cc administered   10 cc discarded  Oral Contrast: Omnipaque 300  Complications: None reported at time of study completion    PROCEDURE:  CT of the Abdomen and Pelvis was performed.  Sagittal and coronal reformats were performed.    FINDINGS:  LOWER CHEST: Within normal limits.    LIVER: Subcentimeter indeterminant hypodense focus in the caudate lobe.  BILE DUCTS: Normal caliber.  GALLBLADDER: Within normal limits.  SPLEEN: Within normal limits.  PANCREAS: Within normal limits.  ADRENALS: Within normal limits.  KIDNEYS/URETERS: Within normal limits.    BLADDER: Within normal limits.  REPRODUCTIVE ORGANS: Prostate within normal limits.    BOWEL: No bowel obstruction. Appendix is normal. Long segment colonic wall thickening extending from transverse colon to rectum.  PERITONEUM: No ascites.  VESSELS: Within normal limits.  RETROPERITONEUM/LYMPH NODES: No lymphadenopathy.  ABDOMINAL WALL: Within normal limits.  BONES: Within normal limits.    IMPRESSION:  Long segment colitis, likely infectious.    < end of copied text >              Radiology: all available radiological tests reviewed    Advanced directives addressed: full resuscitation

## 2021-06-03 NOTE — DISCHARGE NOTE PROVIDER - NSDCCPCAREPLAN_GEN_ALL_CORE_FT
PRINCIPAL DISCHARGE DIAGNOSIS  Diagnosis: Colitis  Assessment and Plan of Treatment: Colonoscopy reveals inflammation - f/u GI for biopsy results.  Found to have enterotoxgenic E coli - completed couse of cipro  continue oral hydration  if you have fever and or worsening symptoms come back to ED.   Follow up with your gastroenterologist  incidental subcentimeter hypodense focus in caudate lobe - out patient monitoring.

## 2021-06-03 NOTE — PROGRESS NOTE ADULT - SUBJECTIVE AND OBJECTIVE BOX
Patient is a 25y old  Male who presents with a chief complaint of diarrhea (03 Jun 2021 13:18)      HPI:  24 y/o M w/ PMH of ulcerative colitis, p/w diarrhea. States he has had diarrhea for 2 weeks, and completed PO vanco for c.diff and was recently started on Dificid by his brother, who is a gastroenterologist (not affiliated / [facility]). Patient also noticed seeing blood in stool, and has some abdominal pain. Presently patient is comfortable. Denies nausea, vomiting, fever, chills, cough, runny nose, sore throat, CP, SOB.        no farther BM since colon nad feels more comfortable    PSH: Denies    Social Hx: Denies x 3    Family Hx: Denies    (01 Jun 2021 04:46)      PAST MEDICAL & SURGICAL HISTORY:  C. difficile colitis    UC (ulcerative colitis)    No significant past surgical history        MEDICATIONS  (STANDING):  ciprofloxacin     Tablet 500 milliGRAM(s) Oral daily  fidaxomicin Oral Tab/Cap - Peds 200 milliGRAM(s) Oral two times a day  heparin   Injectable 5000 Unit(s) SubCutaneous every 12 hours  mesalamine DR Capsule 800 milliGRAM(s) Oral three times a day  sodium chloride 0.9%. 1000 milliLiter(s) (100 mL/Hr) IV Continuous <Continuous>    MEDICATIONS  (PRN):      Allergies    No Known Allergies    Intolerances        SOCIAL HISTORY:  NC  FAMILY HISTORY:  NC    REVIEW OF SYSTEMS:    CONSTITUTIONAL: No weakness, fevers or chills  EYES/ENT: No visual changes;  No vertigo or throat pain   NECK: No pain or stiffness  RESPIRATORY: No cough, wheezing, hemoptysis; No shortness of breath  CARDIOVASCULAR: No chest pain or palpitations  GENITOURINARY: No dysuria, frequency or hematuria  NEUROLOGICAL: No numbness or weakness  SKIN: No itching, burning, rashes, or lesions   All other review of systems is negative unless indicated above.    Vital Signs Last 24 Hrs  T(C): 36.5 (03 Jun 2021 09:04), Max: 37.1 (02 Jun 2021 20:50)  T(F): 97.7 (03 Jun 2021 09:04), Max: 98.8 (02 Jun 2021 20:50)  HR: 65 (03 Jun 2021 09:04) (65 - 85)  BP: 92/47 (03 Jun 2021 09:04) (92/47 - 114/80)  BP(mean): --  RR: 18 (03 Jun 2021 09:04) (18 - 18)  SpO2: 98% (03 Jun 2021 09:04) (98% - 100%)    PHYSICAL EXAM:    Constitutional: NAD, well-developed  HEENT: EOMI, throat clear  Neck: No LAD, supple  Respiratory: CTA and P  Cardiovascular: S1 and S2, RRR, no M  Gastrointestinal: BS+, soft, mild lower tend/ND, neg HSM,  Extremities: No peripheral edema, neg clubing, cyanosis  Vascular: 2+ peripheral pulses  Neurological: A/O x 3, no focal deficits  Psychiatric: Normal mood, normal affect  Skin: No rashes    LABS:  CBC Full  -  ( 02 Jun 2021 10:08 )  WBC Count : 8.68 K/uL  RBC Count : 4.11 M/uL  Hemoglobin : 12.2 g/dL  Hematocrit : 36.9 %  Platelet Count - Automated : 369 K/uL  Mean Cell Volume : 89.8 fl  Mean Cell Hemoglobin : 29.7 pg  Mean Cell Hemoglobin Concentration : 33.1 gm/dL  Auto Neutrophil # : x  Auto Lymphocyte # : x  Auto Monocyte # : x  Auto Eosinophil # : x  Auto Basophil # : x  Auto Neutrophil % : x  Auto Lymphocyte % : x  Auto Monocyte % : x  Auto Eosinophil % : x  Auto Basophil % : x    06-02    142  |  110<H>  |  2<L>  ----------------------------<  136<H>  3.1<L>   |  24  |  1.04    Ca    9.0      02 Jun 2021 10:08              RADIOLOGY & ADDITIONAL STUDIES:  CT reviewed

## 2021-06-03 NOTE — DISCHARGE NOTE PROVIDER - HOSPITAL COURSE
CC: Diarrhea  HPI:  24 y/o M w/ PMH of ulcerative colitis, p/w diarrhea. States he has had diarrhea for 2 weeks, and completed PO vanco for c.diff and was recently started on Dificid by his brother, who is a gastroenterologist (not affiliated / [facility]). Patient also noticed seeing blood in stool, and has some abdominal pain. Presently patient is comfortable. Denies nausea, vomiting, fever, chills, cough, runny nose, sore throat, CP, SOB.    Hospital Course:   s/p colonoscopy -  diffuse colitis. Patient's care discussed with Dr. DURÁN -  at this time -  plan is to continue with cipro.  Steroids on hold.  S/p IVFs and potassium repleted.   Pt feeling okay, states he wants to go home.  No fever, chills, bloody diarrhea.  Completing 3 day course of cipro for enterotoxigenic E coli.  Pt on dificid for cdiff ppx.  Pt HD stable, afebrile.  To go home with close out patient GI follow up.      REVIEW OF SYSTEMS: All other review of systems is negative unless indicated above.      Vital Signs Last 24 Hrs  T(C): 36.5 (03 Jun 2021 09:04), Max: 37.1 (02 Jun 2021 20:50)  T(F): 97.7 (03 Jun 2021 09:04), Max: 98.8 (02 Jun 2021 20:50)  HR: 65 (03 Jun 2021 09:04) (65 - 85)  BP: 92/47 (03 Jun 2021 09:04) (92/47 - 114/80)  BP(mean): --  RR: 18 (03 Jun 2021 09:04) (17 - 18)  SpO2: 98% (03 Jun 2021 09:04) (98% - 100%)    PHYSICAL EXAM:    Constitutional: NAD, awake and alert, well-developed  HEENT: PERR, EOMI, Normal Hearing, MMM  Neck: Soft and supple  Respiratory: Breath sounds are clear bilaterally, No wheezing, rales or rhonchi  Cardiovascular: S1 and S2, regular rate and rhythm, no Murmurs, gallops or rubs  Gastrointestinal: Bowel Sounds present, soft, nontender, nondistended, no guarding, no rebound  Extremities: No peripheral edema  Neurological: A/O x 3, no focal deficits in my limited exam    med/labs: Reviewed and interpreted       24 y/o M w/ PMH of ulcerative colitis, p/w diarrhea.    * Colitis differential includes and is not limited to infectious /  informatory    - CT w/ recent C.diff s/p PO vanco and h/o ulcerative colitis  - c. diff PCR -  no growth  - GI PCR -  noted ETEC - s/p 3 day course of cipro.   - c/w Dificid   - s.p IVF hydration and potassium supplementation  - tolerating diet  - follow up outpatient GI    * Liver hypodense focus noted on CT  -F/u GI recommendations for further work up   - according to the patient -  he had a recent CT scan - which did not show any abnormalities around the liver region.     Attending Statement: 40 minutes spent on total encounter and discharge planning.

## 2021-06-04 NOTE — CDI QUERY NOTE - NSCDIOTHERTXTBX2_GEN_ALL_CORE_FT
There is conflicting documentation regarding the patient's nutrition status.      The patient received a nutrition assessment by a Registered Dietician on 6/2/2021.      The documentation of this assessment states:       · Malnutrition  Patient meets criteria for moderate protein-calorie malnutrition in context of chronic disease.  PO intake < 75 % nutritional needs > one month.  Wt loss of 6% in 6 weeks.  Decreased PO Intake partly intentional, pt observed Ramadan and fasted during day.  · Patient meets criteria for malnutrition  yes    Upon Nutritional Assessment by the Registered Dietitian Your Patient was Determined to Meet Criteria/Has Evidence of the Following Diagnosis:  Moderate protein-calorie malnutrition    Additional Comments/Dietitian Recommendations  Suggest change diet to low fiber due to diarrhea  Record PO intake in EMR after each meal (nursing.)   Add ensure enlive 8 oz tid  add gelatein bid  add MVI w minerals  Tray set-up  Monitor PO intake, tolerance, labs and weight.      Chart documentation also states the patient has Severe protein Calorie Malnutrition.  Details of Malnutrition Diagnosis/Diagnoses  This patient has been assessed with a concern for Malnutrition and was treated during this hospitalization for the following Nutrition diagnosis/diagnoses:     -  06/02/2021: Severe protein-calorie malnutrition       Can the nutrition diagnosis and criteria be clarified, after study?  - Moderate protein-calorie malnutrition ( please also document the criteria used to support this diagnosis)  - Severe protein-calorie malnutrition (please also document the criteria used to support this diagnosis)  -Other (please specify)  -Not clinically significant

## 2021-06-04 NOTE — CDI QUERY NOTE - NSCDIOTHERTXTBX_GEN_ALL_CORE_HH
Please clarify the type of colitis?  A) Infectious colitis due to E coli  B) Inflammatory colitis  C) Unable to determine  D) Not clinically significant  E) Other ( Please specify)     CHART DOCUMENTATION:    Specimen Source: .Stool para galdino (06.01.21 @ 02:54)   No Protozoa seen by trichrome stain   Enterotoxigenic E. coli (ETEC)     C Diff by PCR Result: Elli (06.01.21 @ 02:54)     Infectious disease progress note on 6/3/2021:  Patient admitted with colitis, history of CDiff colitis and found to have Enterotoxigenic E coli in the stool; also noted with leukocytosis most likely reactive to infection    Discharge progress note on 6/3/2021:  * Colitis differential includes and is not limited to infectious /  informatory    - CT w/ recent C.diff s/p PO vanco and h/o ulcerative colitis  - c. diff PCR -  no growth  - GI PCR -  noted ETEC - s/p 3 day course of cipro.

## 2021-06-10 DIAGNOSIS — A04.4 OTHER INTESTINAL ESCHERICHIA COLI INFECTIONS: ICD-10-CM

## 2021-06-10 DIAGNOSIS — D84.9 IMMUNODEFICIENCY, UNSPECIFIED: ICD-10-CM

## 2021-06-10 DIAGNOSIS — E87.6 HYPOKALEMIA: ICD-10-CM

## 2021-06-10 DIAGNOSIS — K51.90 ULCERATIVE COLITIS, UNSPECIFIED, WITHOUT COMPLICATIONS: ICD-10-CM

## 2021-06-10 DIAGNOSIS — E44.0 MODERATE PROTEIN-CALORIE MALNUTRITION: ICD-10-CM

## 2021-06-11 LAB — VIRUS SPEC CULT: SIGNIFICANT CHANGE UP

## 2022-02-15 NOTE — PROGRESS NOTE ADULT - ASSESSMENT
26 y/o M w/ PMH of ulcerative colitis, for which he uses mesalamine admitted on 6/1 for evaluation of severe diarrhea and abdominal pain. He states he started to have diarrhea in Mid May, was diagnosed with CDiff and was on oral vancomycin for 10 days; he did not take any antibiotics preceding his Cdiff diagnosis. Once he finished the po vancomycin, his diarrhea returned and he was started on Dificid prescribed by his brother who is a gastroenterologist and which he was on upon admission. He notes that he ate [place] food that was leftover and smelled odd about 24-48 hours prior to his severe diarrhea. GI PCR testing done on this admission is positive for Enterotoxigenic E coli and imaging is positive for long segment colitis from transverse colon to rectum. He is still having numerous watery stools.     1. Patient admitted with colitis, history of CDiff colitis and found to have Enterotoxigenic E coli in the stool; also noted with leukocytosis most likely reactive to infection  - follow up cultures   - CDiff assay is negative  - serial cbc and monitor temperature   - iv hydration and supportive care   - reviewed prior medical records to evaluate for resistant or atypical pathogens   - contact isolation  - patient to continue Dificid for CDiff, even if the toxin assay is negative because will be starting on ciprofloxacin 500 mg po q day for three days total, exposure to quinolone promotes CDiff, day #3  - will be treating the Enterotoxigenic E coli, given that the patient is immunocompromised given his Ulcerative colitis, therefore the start of ciprofloxain  - colonoscopy results noted; await biopsy  - okay from id standpoint to discharge home on his difucid for ten more days given recent quinolone use  - GI follow up as outpatient
Shonna,     Thank you for coming in today.     Please complete your labs as fasting - no food/drink - only water - for at least 8 hours prior to lab draw.    We will be in touch regarding results. Regardless, please follow up for annual so we can review at that time, too.    [email or web address]    
26 y/o M w/ PMH of ulcerative colitis, for which he uses mesalamine admitted on 6/1 for evaluation of severe diarrhea and abdominal pain. He states he started to have diarrhea in Mid May, was diagnosed with CDiff and was on oral vancomycin for 10 days; he did not take any antibiotics preceding his Cdiff diagnosis. Once he finished the po vancomycin, his diarrhea returned and he was started on Dificid prescribed by his brother who is a gastroenterologist and which he was on upon admission. He notes that he ate [place] food that was leftover and smelled odd about 24-48 hours prior to his severe diarrhea. GI PCR testing done on this admission is positive for Enterotoxigenic E coli and imaging is positive for long segment colitis from transverse colon to rectum. He is still having numerous watery stools.     1. Patient admitted with colitis, history of CDiff colitis and found to have Enterotoxigenic E coli in the stool; also noted with leukocytosis most likely reactive to infection  - follow up cultures   - CDiff assay is negative  - serial cbc and monitor temperature   - iv hydration and supportive care   - reviewed prior medical records to evaluate for resistant or atypical pathogens   - contact isolation  - patient to continue Dificid for CDiff, even if the toxin assay is negative because will be starting on ciprofloxacin 500 mg po q day for three days total, exposure to quinolone promotes CDiff, day #2  - will be treating the Enterotoxigenic E coli, given that the patient is immunocompromised given his Ulcerative colitis, therefore the start of ciprofloxain  - colonoscopy results noted; await biopsy
24 y/o M w/ PMH of ulcerative colitis, for which he uses mesalamine admitted on 6/1 for evaluation of severe diarrhea and abdominal pain. He states he started to have diarrhea in Mid May, was diagnosed with CDiff and was on oral vancomycin for 10 days; he did not take any antibiotics preceding his Cdiff diagnosis. Once he finished the po vancomycin, his diarrhea returned and he was started on Dificid prescribed by his brother who is a gastroenterologist and which he was on upon admission. He notes that he ate [place] food that was leftover and smelled odd about 24-48 hours prior to his severe diarrhea. GI PCR testing done on this admission is positive for Enterotoxigenic E coli and imaging is positive for long segment colitis from transverse colon to rectum. He is still having numerous watery stools.    On colonoscopy, he had pancolitis and difficult delineating acute versus chronic colitis.  Unusual that he has ulcerative colitis superimposed on possible enterotoxigenic E. coli together with C. difficile.  Enterotoxigenic E. coli treatment should be completed and then would consider further treatment with mesalamine.  C. difficile treatment should be continued for at least 10 days after therapy has been completed if ID agrees.    Additionally, possibility of ulcerative colitis exacerbation that may require treatment with prednisone if does not respond to mesalamine discussed with patient and brother who is a gastroenterologist.    Risks of prednisone discussed.  He sees a gastroenterologist as an outpatient in my office will fax copy of pathology to them.  Patient was seen this morning.  Pathology reviewed and shows moderate chronic changes.        I DW pta nd brother prior to path availablity

## 2022-03-28 NOTE — CDI QUERY NOTE - NSCDINOTECODERS_GEN_A_CORE
CDI Specialist Health Maintenance Due   Topic Date Due   • Pneumococcal Vaccine 0-64 (1 of 2 - PPSV23) Never done       Patient is due for topics as listed above but is not proceeding with Immunization(s) Pneumococcal at this time.

## 2023-07-23 NOTE — DIETITIAN NUTRITION RISK NOTIFICATION - PHYSICAL ASSESSMENT CALF
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       OBJECTIVE                                                                                                                     Range of Motion (ROM)   (degrees unless noted; active unless noted; norms in ( ); negative=lacking to 0, positive=beyond 0)  Shoulder:    - Abduction (180):        • Left:   Passive: 95                       Treatment     Therapeutic Exercise  Date of Surgery: 5/23/23 (7 weeks - 7/12/23)  Next Doctor's appt: 6/29/23      Patient education: home exercise program review/update  Reassessment   Left shoulder flexion/abduction/external rotation passive range of motion by physical therapist  Supine left shoulder abduction - 3x5  Wall push ups - 3x10  bilateral shoulder internal rotation active assisted range of motion with dowel - 2x10  Left wall slides - 2x10      Deferred:   Resisted left shoulder external rotation - red theraband, 3x10  Prone Y - 2x10  sidelying left shoulder abduction - unable due to pain and weakness  Bicep curls - 2x10 (10lbs.)   Active assisted range of motion flex with dowel - 6lbs., 3x10  bilateral shoulder presses in supine with dowel - 8lbs., 3x10  Triceps push down on cable machine - 7lbs., 2x10    Supine left shoulder external rotation active assisted range of motion with dowel - 5x   left shoulder abduction active assisted range of motion with dowel - 10x3\"   Seated left shoulder flexion and abduction holds on table - 3x30\" each   Stool Scoot Active Assistance Shoulder Flexion and Scaption x 15 each    Manual Therapy   Soft tissue massage  moderate to incision scar  Joint Mobilizations Posterior, Inferior Grade 3    Deferred:  Deep Tissue Massage Right lateral deltoid, supraspinatus insertion    Therapeutic Activity  Deferred:   Left shoulder abduction active assisted range of motion - 10x3\"    Neuromuscular Re-Education  Bilateral shoulder rows - red theratube, 2x15   Bilateral shoulder extension - green theratube, 3x10        Deferred:   sidelying left shoulder external rotation (left) - 10x2  Prone rows (left) - 2x10  Prone left shoulder extension - 10x  left shoulder abduction, flexion - 10x10\" each      Skilled input: verbal instruction/cues, tactile instruction/cues, posture correction and as detailed above    Writer verbally educated and received verbal consent for hand placement, positioning of patient, and techniques to be performed today from patient for clothing adjustments for techniques, hand placement and palpation for techniques and therapist position for techniques as described above and how they are pertinent to the patient's plan of care.  Home Exercise Program  Access Code: [email or web address]  URL: [email or web address]  Date: 07/19/2023  Prepared by: Arturo Watters    Exercises  - Supine Shoulder Flexion Extension AAROM with Dowel  - 2-3 x daily - 7 x weekly - 1 sets - 20 reps  - Supine Shoulder External Rotation in 45 Degrees Abduction AAROM with Dowel  - 2-3 x daily - 7 x weekly - 2 sets - 10 reps - 3 seconds hold  - Standing Shoulder Abduction ROM with Dowel  - 2-3 x daily - 7 x weekly - 1 sets - 20 reps - 3 seconds hold  - Seated Shoulder Flexion Towel Slide at Table Top  - 2-3 x daily - 7 x weekly - 1 sets - 3 reps - 30 seconds hold  - Seated Shoulder Abduction Towel Slide at Table Top  - 2-3 x daily - 7 x weekly - 1 sets - 3 reps - 30 seconds hold  - Shoulder Flexion Wall Slide with Towel  - 2-3 x daily - 7 x weekly - 2-4 sets - 5 reps  - Shoulder External Rotation with Anchored Resistance  - 1 x daily - 4-7 x weekly -  2-3 sets - 10 reps  - Standing Shoulder Row with Anchored Resistance  - 1 x daily - 4-7 x weekly - 2-3 sets - 10 reps  - Shoulder extension with resistance - Neutral  - 1 x daily - 4-7 x weekly - 2-3 sets - 10 reps  - Standing Bicep Curls Supinated with Dumbbells  - 1 x daily - 4-7 x weekly - 3 sets - 10 reps  - Seated Shoulder Flexion AAROM with Pulley Behind  - 1 x daily - 7 x weekly - 3 sets - 10 reps  - Seated Shoulder Abduction AAROM with Pulley Behind  - 1 x daily - 7 x weekly - 3 sets - 10 reps      ASSESSMENT                                                                                                            Patient continues to demonstrate difficulty with sidelying left shoulder abduction active range of motion. Better tolerance and performance with supine left shoulder abduction active range of motion (added to home exercise program). Updated home exercise program to include wall push ups, and bilateral shoulder internal rotation active assisted range of motion with dowel as well. Improved performance with bilateral shoulder extensions.   Education:   - Results of above outlined education: Verbalizes understanding    PLAN                                                                                                                           Suggestions for next session as indicated: Progress per plan of care       Therapy procedure time and total treatment time can be found documented on the Time Entry flowsheet

## 2024-08-21 NOTE — H&P ADULT - NSHPLANGLIMITEDENGLISH_GEN_A_CORE
Called patient's mother to discuss lab results. Potassium was slightly high which by itself is something to monitor over time rather than act on immediately. Fasting blood glucose was low. Per mother patient has never had episodes of hypoglycemia to where she feels shaky, diaphoretic, faint, hyperphagia etc. Uncertain how long she was fasting prior to the labs, but while this is not concerning for diabetes I recommended family be on the lookout for any such symptoms in the future. Otherwise labs were normal. Mother expressed understanding and appreciation.  -Toribio Flores MD   No